# Patient Record
Sex: MALE | Race: WHITE | Employment: OTHER | URBAN - METROPOLITAN AREA
[De-identification: names, ages, dates, MRNs, and addresses within clinical notes are randomized per-mention and may not be internally consistent; named-entity substitution may affect disease eponyms.]

---

## 2017-01-27 ENCOUNTER — HOSPITAL ENCOUNTER (OUTPATIENT)
Dept: LAB | Age: 67
Discharge: HOME | End: 2017-01-27
Payer: MEDICARE

## 2017-01-27 ENCOUNTER — OFFICE VISIT (OUTPATIENT)
Dept: INTERNAL MEDICINE CLINIC | Age: 67
End: 2017-01-27

## 2017-01-27 VITALS
RESPIRATION RATE: 18 BRPM | WEIGHT: 197 LBS | HEIGHT: 68 IN | TEMPERATURE: 98.1 F | DIASTOLIC BLOOD PRESSURE: 88 MMHG | BODY MASS INDEX: 29.86 KG/M2 | OXYGEN SATURATION: 98 % | HEART RATE: 84 BPM | SYSTOLIC BLOOD PRESSURE: 138 MMHG

## 2017-01-27 DIAGNOSIS — E11.9 DIABETES MELLITUS TYPE 2, DIET-CONTROLLED (HCC): ICD-10-CM

## 2017-01-27 DIAGNOSIS — I25.10 CORONARY ARTERY DISEASE INVOLVING NATIVE CORONARY ARTERY OF NATIVE HEART WITHOUT ANGINA PECTORIS: Chronic | ICD-10-CM

## 2017-01-27 DIAGNOSIS — Z01.818 PREOP GENERAL PHYSICAL EXAM: ICD-10-CM

## 2017-01-27 DIAGNOSIS — R03.0 ELEVATED BLOOD PRESSURE (NOT HYPERTENSION): ICD-10-CM

## 2017-01-27 DIAGNOSIS — M19.072 DEGENERATIVE ARTHRITIS OF TOE JOINT, LEFT: Primary | ICD-10-CM

## 2017-01-27 PROCEDURE — 36415 COLL VENOUS BLD VENIPUNCTURE: CPT

## 2017-01-27 PROCEDURE — 80048 BASIC METABOLIC PNL TOTAL CA: CPT

## 2017-01-27 NOTE — PATIENT INSTRUCTIONS
DASH Diet: Care Instructions  Your Care Instructions  The DASH diet is an eating plan that can help lower your blood pressure. DASH stands for Dietary Approaches to Stop Hypertension. Hypertension is high blood pressure. The DASH diet focuses on eating foods that are high in calcium, potassium, and magnesium. These nutrients can lower blood pressure. The foods that are highest in these nutrients are fruits, vegetables, low-fat dairy products, nuts, seeds, and legumes. But taking calcium, potassium, and magnesium supplements instead of eating foods that are high in those nutrients does not have the same effect. The DASH diet also includes whole grains, fish, and poultry. The DASH diet is one of several lifestyle changes your doctor may recommend to lower your high blood pressure. Your doctor may also want you to decrease the amount of sodium in your diet. Lowering sodium while following the DASH diet can lower blood pressure even further than just the DASH diet alone. Follow-up care is a key part of your treatment and safety. Be sure to make and go to all appointments, and call your doctor if you are having problems. It's also a good idea to know your test results and keep a list of the medicines you take. How can you care for yourself at home? Following the DASH diet  · Eat 4 to 5 servings of fruit each day. A serving is 1 medium-sized piece of fruit, ½ cup chopped or canned fruit, 1/4 cup dried fruit, or 4 ounces (½ cup) of fruit juice. Choose fruit more often than fruit juice. · Eat 4 to 5 servings of vegetables each day. A serving is 1 cup of lettuce or raw leafy vegetables, ½ cup of chopped or cooked vegetables, or 4 ounces (½ cup) of vegetable juice. Choose vegetables more often than vegetable juice. · Get 2 to 3 servings of low-fat and fat-free dairy each day. A serving is 8 ounces of milk, 1 cup of yogurt, or 1 ½ ounces of cheese. · Eat 6 to 8 servings of grains each day.  A serving is 1 slice of bread, 1 ounce of dry cereal, or ½ cup of cooked rice, pasta, or cooked cereal. Try to choose whole-grain products as much as possible. · Limit lean meat, poultry, and fish to 2 servings each day. A serving is 3 ounces, about the size of a deck of cards. · Eat 4 to 5 servings of nuts, seeds, and legumes (cooked dried beans, lentils, and split peas) each week. A serving is 1/3 cup of nuts, 2 tablespoons of seeds, or ½ cup of cooked beans or peas. · Limit fats and oils to 2 to 3 servings each day. A serving is 1 teaspoon of vegetable oil or 2 tablespoons of salad dressing. · Limit sweets and added sugars to 5 servings or less a week. A serving is 1 tablespoon jelly or jam, ½ cup sorbet, or 1 cup of lemonade. · Eat less than 2,300 milligrams (mg) of sodium a day. If you limit your sodium to 1,500 mg a day, you can lower your blood pressure even more. Tips for success  · Start small. Do not try to make dramatic changes to your diet all at once. You might feel that you are missing out on your favorite foods and then be more likely to not follow the plan. Make small changes, and stick with them. Once those changes become habit, add a few more changes. · Try some of the following:  ¨ Make it a goal to eat a fruit or vegetable at every meal and at snacks. This will make it easy to get the recommended amount of fruits and vegetables each day. ¨ Try yogurt topped with fruit and nuts for a snack or healthy dessert. ¨ Add lettuce, tomato, cucumber, and onion to sandwiches. ¨ Combine a ready-made pizza crust with low-fat mozzarella cheese and lots of vegetable toppings. Try using tomatoes, squash, spinach, broccoli, carrots, cauliflower, and onions. ¨ Have a variety of cut-up vegetables with a low-fat dip as an appetizer instead of chips and dip. ¨ Sprinkle sunflower seeds or chopped almonds over salads. Or try adding chopped walnuts or almonds to cooked vegetables. ¨ Try some vegetarian meals using beans and peas. Add garbanzo or kidney beans to salads. Make burritos and tacos with mashed carver beans or black beans. Where can you learn more? Go to http://french-hailey.info/. Enter G348 in the search box to learn more about \"DASH Diet: Care Instructions. \"  Current as of: March 23, 2016  Content Version: 11.1  © 5802-9301 [email or web address]. Care instructions adapted under license by [employer] (which disclaims liability or warranty for this information). If you have questions about a medical condition or this instruction, always ask your healthcare professional. [place] [address] any warranty or liability for your use of this information. Learning About Type 2 Diabetes  What is type 2 diabetes? Insulin is a hormone that helps your body use sugar from your food as energy. Type 2 diabetes happens when your body can't use insulin the right way. Over time, the pancreas can't make enough insulin. If you don't have enough insulin, too much sugar stays in your blood. If you are overweight, get little or no exercise, or have type 2 diabetes in your family, you are more likely to have problems with the way insulin works in your body.  Americans, Hispanics, Native Americans,  Americans, and Pacific Islanders have a higher risk for type 2 diabetes. Type 2 diabetes can be prevented or delayed with a healthy lifestyle, which includes staying at a healthy weight, making smart food choices, and getting regular exercise. What can you expect with type 2 diabetes? Saurabh Solis keep hearing about how important it is to keep your blood sugar within a target range. That's because over time, high blood sugar can lead to serious problems. It can:  · Harm your eyes, nerves, and kidneys. · Damage your blood vessels, leading to heart disease and stroke. · Reduce blood flow and cause nerve damage to parts of your body, especially your feet.  This can cause slow healing and pain when you walk. · Make your immune system weak and less able to fight infections. When people hear the word \"diabetes,\" they often think of problems like these. But daily care and treatment can help prevent or delay these problems. The goal is to keep your blood sugar in a target range. That's the best way to reduce your chance of having more problems from diabetes. What are the symptoms? Some people who have type 2 diabetes may not have any symptoms early on. Many people with the disease don't even know they have it at first. But with time, diabetes starts to cause symptoms. You experience most symptoms of type 2 diabetes when your blood sugar is either too high or too low. The most common symptoms of high blood sugar include:  · Thirst.  · Frequent urination. · Weight loss. · Blurry vision. The symptoms of low blood sugar include:  · Sweating. · Shakiness. · Weakness. · Hunger. · Confusion. How can you prevent type 2 diabetes? The best way to prevent or delay type 2 diabetes is to adopt healthy habits, which include:  · Staying at a healthy weight. · Exercising regularly. · Eating healthy foods. How is type 2 diabetes treated? If you have type 2 diabetes, here are the most important things you can do. · Take your diabetes medicines. · Check your blood sugar as often as your doctor recommends. Also, get a hemoglobin A1c test at least every 6 months. · Try to eat a variety of foods and to spread carbohydrate throughout the day. Carbohydrate raises blood sugar higher and more quickly than any other nutrient does. Carbohydrate is found in sugar, breads and cereals, fruit, starchy vegetables such as potatoes and corn, and milk and yogurt. · Get at least 30 minutes of exercise on most days of the week. Walking is a good choice. You also may want to do other activities, such as running, swimming, cycling, or playing tennis or team sports.  If your doctor says it's okay, do muscle-strengthening exercises at least 2 times a week. · See your doctor for checkups and tests on a regular schedule. · If you have high blood pressure or high cholesterol, take the medicines as prescribed by your doctor. · Do not smoke. Smoking can make health problems worse. This includes problems you might have with type 2 diabetes. If you need help quitting, talk to your doctor about stop-smoking programs and medicines. These can increase your chances of quitting for good. Follow-up care is a key part of your treatment and safety. Be sure to make and go to all appointments, and call your doctor if you are having problems. It's also a good idea to know your test results and keep a list of the medicines you take. Where can you learn more? Go to http://french-hailey.info/. Enter G369 in the search box to learn more about \"Learning About Type 2 Diabetes. \"  Current as of: May 23, 2016  Content Version: 11.1  © 4459-4285 [employer]. Care instructions adapted under license by [employer] (which disclaims liability or warranty for this information). If you have questions about a medical condition or this instruction, always ask your healthcare professional. [place] [address] any warranty or liability for your use of this information.

## 2017-01-27 NOTE — MR AVS SNAPSHOT
Visit Information Date & Time Provider Department Dept. Phone Encounter #  
 1/27/2017  2:00 PM Savanna Mayen NP [facility] [address] [id number] Upcoming Health Maintenance Date Due Hepatitis C Screening 1950 FOOT EXAM Q1 11/12/1958 EYE EXAM RETINAL OR DILATED Q1 11/12/1958 ZOSTER VACCINE AGE 60> 11/12/2008 GLAUCOMA SCREENING Q2Y 11/12/2013 MEDICARE YEARLY EXAM 1/7/2017 MICROALBUMIN Q1 1/8/2017 LIPID PANEL Q1 1/8/2017 HEMOGLOBIN A1C Q6M 5/15/2017 Pneumococcal 65+ Low/Medium Risk (2 of 2 - PPSV23) 12/19/2017 COLONOSCOPY 7/17/2023 DTaP/Tdap/Td series (2 - Td) 1/1/2024 Allergies as of 1/27/2017  Review Complete On: 1/27/2017 By: Savanna Mayen NP Severity Noted Reaction Type Reactions Atorvastatin  02/23/2015    Myalgia  
 forgetfullness Simvastatin  02/23/2015    Other (comments)  
 forgetfullness Sulfa (Sulfonamide Antibiotics)  07/05/2011    Unknown (comments) Patient is unaware Sulfa (Sulfonamide Antibiotics)  12/19/2013   Side Effect Unknown (comments) Current Immunizations  Reviewed on 12/19/2013 Name Date Influenza High Dose Vaccine PF 11/15/2016 Pneumococcal Vaccine (Unspecified Type) 12/19/2012 Tdap 1/1/2014 Not reviewed this visit You Were Diagnosed With   
  
 Codes Comments Degenerative arthritis of toe joint, left    -  Primary ICD-10-CM: S37.512 ICD-9-CM: 715.97 Preop general physical exam     ICD-10-CM: U61.824 ICD-9-CM: V72.83 Diabetes mellitus type 2, diet-controlled ([facility] [address]     ICD-10-CM: E11.9 ICD-9-CM: 250.00 Coronary artery disease involving native coronary artery of native heart without angina pectoris     ICD-10-CM: I25.10 ICD-9-CM: 414.01 Elevated blood pressure (not hypertension)     ICD-10-CM: R03.0 ICD-9-CM: 796.2 Vitals BP Pulse Temp Resp Height(growth percentile) Weight(growth percentile) 138/88 (BP 1 Location: Left arm, BP Patient Position: Sitting) 84 98.1 °F (36.7 °C) (Oral) 18 5' 7.5\" (1.715 m) 197 lb (89.4 kg) SpO2 BMI Smoking Status 98% 30.4 kg/m2 Former Smoker Vitals History BMI and BSA Data Body Mass Index Body Surface Area  
 30.4 kg/m 2 2.06 m 2 Preferred Pharmacy Pharmacy Name Phone Anita Lopez [address] [facility], [address] [facility], [address] [phone] Your Updated Medication List  
  
   
This list is accurate as of: 1/27/17  3:10 PM.  Always use your most recent med list.  
  
  
  
  
 aspirin delayed-release 81 mg tablet Take 1 Tab by mouth daily. Blood-Glucose Meter monitoring kit Commonly known as:  FREESTYLE FREEDOM LITE For blood sugar testing twice daily. Dx E11.9 Flaxseed Oil Oil  
by Does Not Apply route. glucose blood VI test strips strip Commonly known as:  FREESTYLE LITE STRIPS Test fasting blood sugar twice daily. Dx E11.9 Lancets Mis Commonly known as:  FREESTYLE LANCETS For blood sugar testing twice daily. Dx E11.9 MULTI VITAMIN PO Take  by mouth. naproxen 500 mg tablet Commonly known as:  NAPROSYN Take 1 Tab by mouth two (2) times daily (with meals). We Performed the Following AMB POC EKG ROUTINE W/ 12 LEADS, INTER & REP [94626 CPT(R)] METABOLIC PANEL, BASIC [46939 CPT(R)] Patient Instructions DASH Diet: Care Instructions Your Care Instructions The DASH diet is an eating plan that can help lower your blood pressure. DASH stands for Dietary Approaches to Stop Hypertension. Hypertension is high blood pressure. The DASH diet focuses on eating foods that are high in calcium, potassium, and magnesium. These nutrients can lower blood pressure. The foods that are highest in these nutrients are fruits, vegetables, low-fat dairy products, nuts, seeds, and legumes.  But taking calcium, potassium, and magnesium supplements instead of eating foods that are high in those nutrients does not have the same effect. The DASH diet also includes whole grains, fish, and poultry. The DASH diet is one of several lifestyle changes your doctor may recommend to lower your high blood pressure. Your doctor may also want you to decrease the amount of sodium in your diet. Lowering sodium while following the DASH diet can lower blood pressure even further than just the DASH diet alone. Follow-up care is a key part of your treatment and safety. Be sure to make and go to all appointments, and call your doctor if you are having problems. It's also a good idea to know your test results and keep a list of the medicines you take. How can you care for yourself at home? Following the DASH diet · Eat 4 to 5 servings of fruit each day. A serving is 1 medium-sized piece of fruit, ½ cup chopped or canned fruit, 1/4 cup dried fruit, or 4 ounces (½ cup) of fruit juice. Choose fruit more often than fruit juice. · Eat 4 to 5 servings of vegetables each day. A serving is 1 cup of lettuce or raw leafy vegetables, ½ cup of chopped or cooked vegetables, or 4 ounces (½ cup) of vegetable juice. Choose vegetables more often than vegetable juice. · Get 2 to 3 servings of low-fat and fat-free dairy each day. A serving is 8 ounces of milk, 1 cup of yogurt, or 1 ½ ounces of cheese. · Eat 6 to 8 servings of grains each day. A serving is 1 slice of bread, 1 ounce of dry cereal, or ½ cup of cooked rice, pasta, or cooked cereal. Try to choose whole-grain products as much as possible. · Limit lean meat, poultry, and fish to 2 servings each day. A serving is 3 ounces, about the size of a deck of cards. · Eat 4 to 5 servings of nuts, seeds, and legumes (cooked dried beans, lentils, and split peas) each week. A serving is 1/3 cup of nuts, 2 tablespoons of seeds, or ½ cup of cooked beans or peas. · Limit fats and oils to 2 to 3 servings each day. A serving is 1 teaspoon of vegetable oil or 2 tablespoons of salad dressing. · Limit sweets and added sugars to 5 servings or less a week. A serving is 1 tablespoon jelly or jam, ½ cup sorbet, or 1 cup of lemonade. · Eat less than 2,300 milligrams (mg) of sodium a day. If you limit your sodium to 1,500 mg a day, you can lower your blood pressure even more. Tips for success · Start small. Do not try to make dramatic changes to your diet all at once. You might feel that you are missing out on your favorite foods and then be more likely to not follow the plan. Make small changes, and stick with them. Once those changes become habit, add a few more changes. · Try some of the following: ¨ Make it a goal to eat a fruit or vegetable at every meal and at snacks. This will make it easy to get the recommended amount of fruits and vegetables each day. ¨ Try yogurt topped with fruit and nuts for a snack or healthy dessert. ¨ Add lettuce, tomato, cucumber, and onion to sandwiches. ¨ Combine a ready-made pizza crust with low-fat mozzarella cheese and lots of vegetable toppings. Try using tomatoes, squash, spinach, broccoli, carrots, cauliflower, and onions. ¨ Have a variety of cut-up vegetables with a low-fat dip as an appetizer instead of chips and dip. ¨ Sprinkle sunflower seeds or chopped almonds over salads. Or try adding chopped walnuts or almonds to cooked vegetables. ¨ Try some vegetarian meals using beans and peas. Add garbanzo or kidney beans to salads. Make burritos and tacos with mashed carver beans or black beans. Where can you learn more? Go to http://french-hailey.info/. Enter F961 in the search box to learn more about \"DASH Diet: Care Instructions. \" Current as of: March 23, 2016 Content Version: 11.1 © 0435-1906 [employer].  Care instructions adapted under license by [address] (which disclaims liability or warranty for this information). If you have questions about a medical condition or this instruction, always ask your healthcare professional. [address] any warranty or liability for your use of this information. Learning About Type 2 Diabetes What is type 2 diabetes? Insulin is a hormone that helps your body use sugar from your food as energy. Type 2 diabetes happens when your body can't use insulin the right way. Over time, the pancreas can't make enough insulin. If you don't have enough insulin, too much sugar stays in your blood. If you are overweight, get little or no exercise, or have type 2 diabetes in your family, you are more likely to have problems with the way insulin works in your body.  Americans, Hispanics, Native Americans,  Americans, and Pacific Islanders have a higher risk for type 2 diabetes. Type 2 diabetes can be prevented or delayed with a healthy lifestyle, which includes staying at a healthy weight, making smart food choices, and getting regular exercise. What can you expect with type 2 diabetes? [facility] about how important it is to keep your blood sugar within a target range. That's because over time, high blood sugar can lead to serious problems. It can: 
· Harm your eyes, nerves, and kidneys. · Damage your blood vessels, leading to heart disease and stroke. · Reduce blood flow and cause nerve damage to parts of your body, especially your feet. This can cause slow healing and pain when you walk. · Make your immune system weak and less able to fight infections. When people hear the word \"diabetes,\" they often think of problems like these. But daily care and treatment can help prevent or delay these problems. The goal is to keep your blood sugar in a target range. That's the best way to reduce your chance of having more problems from diabetes. What are the symptoms? Some people who have type 2 diabetes may not have any symptoms early on. Many people with the disease don't even know they have it at first. But with time, diabetes starts to cause symptoms. You experience most symptoms of type 2 diabetes when your blood sugar is either too high or too low. The most common symptoms of high blood sugar include: · Thirst. 
· Frequent urination. · Weight loss. · Blurry vision. The symptoms of low blood sugar include: · Sweating. · Shakiness. · Weakness. · Hunger. · Confusion. How can you prevent type 2 diabetes? The best way to prevent or delay type 2 diabetes is to adopt healthy habits, which include: 
· Staying at a healthy weight. · Exercising regularly. · Eating healthy foods. How is type 2 diabetes treated? If you have type 2 diabetes, here are the most important things you can do. · Take your diabetes medicines. · Check your blood sugar as often as your doctor recommends. Also, get a hemoglobin A1c test at least every 6 months. · Try to eat a variety of foods and to spread carbohydrate throughout the day. Carbohydrate raises blood sugar higher and more quickly than any other nutrient does. Carbohydrate is found in sugar, breads and cereals, fruit, starchy vegetables such as potatoes and corn, and milk and yogurt. · Get at least 30 minutes of exercise on most days of the week. Walking is a good choice. You also may want to do other activities, such as running, swimming, cycling, or playing tennis or team sports. If your doctor says it's okay, do muscle-strengthening exercises at least 2 times a week. · See your doctor for checkups and tests on a regular schedule. · If you have high blood pressure or high cholesterol, take the medicines as prescribed by your doctor. · Do not smoke. Smoking can make health problems worse. This includes problems you might have with type 2 diabetes.  If you need help quitting, talk to your doctor about stop-smoking programs and medicines. These can increase your chances of quitting for good. Follow-up care is a key part of your treatment and safety. Be sure to make and go to all appointments, and call your doctor if you are having problems. It's also a good idea to know your test results and keep a list of the medicines you take. Where can you learn more? Go to http://french-hailey.info/. Enter N878 in the search box to learn more about \"Learning About Type 2 Diabetes. \" Current as of: May 23, 2016 Content Version: 11.1 © 5374-4041 [employer]. Care instructions adapted under license by [employer] (which disclaims liability or warranty for this information). If you have questions about a medical condition or this instruction, always ask your healthcare professional. [address] any warranty or liability for your use of this information. Introducing [facility]! Dear Maureen Purchase: Thank you for requesting a FFWD account. Our records indicate that you already have an active FFWD account. You can access your account anytime at [email or web address] Did you know that you can access your hospital and ER discharge instructions at any time in FFWD? You can also review all of your test results from your hospital stay or ER visit. Additional Information If you have questions, please visit the Frequently Asked Questions section of the FFWD website at [email or web address] Remember, FFWD is NOT to be used for urgent needs. For medical emergencies, dial 911. Now available from your iPhone and Android! Please provide this summary of care documentation to your next provider. Your primary care clinician is listed as Dameon Madrigal. If you have any questions after today's visit, please call [phone].

## 2017-01-27 NOTE — PROGRESS NOTES
Have you been to the ER or urgent care clinic since your last visit? No     Have you been hospitalized since your last visit? No     Have you been seen or consulted any other health care provider outside of [facility] since your last visit (including pap smears, colonoscopy screening)?    No

## 2017-01-28 LAB
BUN SERPL-MCNC: 12 MG/DL (ref 8–27)
BUN/CREAT SERPL: 13 (ref 10–22)
CALCIUM SERPL-MCNC: 9.4 MG/DL (ref 8.6–10.2)
CHLORIDE SERPL-SCNC: 101 MMOL/L (ref 96–106)
CO2 SERPL-SCNC: 24 MMOL/L (ref 18–29)
CREAT SERPL-MCNC: 0.89 MG/DL (ref 0.76–1.27)
GLUCOSE SERPL-MCNC: 116 MG/DL (ref 65–99)
POTASSIUM SERPL-SCNC: 4.5 MMOL/L (ref 3.5–5.2)
SODIUM SERPL-SCNC: 141 MMOL/L (ref 134–144)

## 2017-02-07 ENCOUNTER — ANESTHESIA EVENT (OUTPATIENT)
Dept: SURGERY | Age: 67
End: 2017-02-07
Payer: MEDICARE

## 2017-02-07 RX ORDER — HYDROMORPHONE HYDROCHLORIDE 1 MG/ML
0.5 INJECTION, SOLUTION INTRAMUSCULAR; INTRAVENOUS; SUBCUTANEOUS
Status: CANCELLED | OUTPATIENT
Start: 2017-02-07 | End: 2017-02-08

## 2017-02-07 RX ORDER — SODIUM CHLORIDE, SODIUM LACTATE, POTASSIUM CHLORIDE, CALCIUM CHLORIDE 600; 310; 30; 20 MG/100ML; MG/100ML; MG/100ML; MG/100ML
125 INJECTION, SOLUTION INTRAVENOUS CONTINUOUS
Status: CANCELLED | OUTPATIENT
Start: 2017-02-07

## 2017-02-07 RX ORDER — ONDANSETRON 2 MG/ML
4 INJECTION INTRAMUSCULAR; INTRAVENOUS AS NEEDED
Status: CANCELLED | OUTPATIENT
Start: 2017-02-07

## 2017-02-07 RX ORDER — ALBUTEROL SULFATE 0.83 MG/ML
2.5 SOLUTION RESPIRATORY (INHALATION) AS NEEDED
Status: CANCELLED | OUTPATIENT
Start: 2017-02-07

## 2017-02-07 RX ORDER — DIPHENHYDRAMINE HYDROCHLORIDE 50 MG/ML
12.5 INJECTION, SOLUTION INTRAMUSCULAR; INTRAVENOUS AS NEEDED
Status: CANCELLED | OUTPATIENT
Start: 2017-02-07 | End: 2017-02-07

## 2017-02-08 ENCOUNTER — ANESTHESIA (OUTPATIENT)
Dept: SURGERY | Age: 67
End: 2017-02-08
Payer: MEDICARE

## 2017-02-08 ENCOUNTER — HOSPITAL ENCOUNTER (OUTPATIENT)
Age: 67
Setting detail: OUTPATIENT SURGERY
Discharge: HOME | End: 2017-02-08
Attending: ORTHOPAEDIC SURGERY | Admitting: ORTHOPAEDIC SURGERY
Payer: MEDICARE

## 2017-02-08 ENCOUNTER — SURGERY (OUTPATIENT)
Age: 67
End: 2017-02-08

## 2017-02-08 VITALS
OXYGEN SATURATION: 99 % | SYSTOLIC BLOOD PRESSURE: 144 MMHG | WEIGHT: 199.52 LBS | HEART RATE: 52 BPM | HEIGHT: 68 IN | TEMPERATURE: 97 F | DIASTOLIC BLOOD PRESSURE: 86 MMHG | BODY MASS INDEX: 30.24 KG/M2 | RESPIRATION RATE: 15 BRPM

## 2017-02-08 LAB
GLUCOSE BLD STRIP.AUTO-MCNC: 129 MG/DL (ref 65–100)
SERVICE CMNT-IMP: ABNORMAL

## 2017-02-08 PROCEDURE — 97161 PT EVAL LOW COMPLEX 20 MIN: CPT

## 2017-02-08 PROCEDURE — 76010000138 HC OR TIME 0.5 TO 1 HR: Performed by: ORTHOPAEDIC SURGERY

## 2017-02-08 PROCEDURE — 74011250636 HC RX REV CODE- 250/636

## 2017-02-08 PROCEDURE — 77030011640 HC PAD GRND REM COVD -A: Performed by: ORTHOPAEDIC SURGERY

## 2017-02-08 PROCEDURE — 77030020754 HC CUF TRNQT 2BLA STRY -B: Performed by: ORTHOPAEDIC SURGERY

## 2017-02-08 PROCEDURE — 77030002916 HC SUT ETHLN J&J -A: Performed by: ORTHOPAEDIC SURGERY

## 2017-02-08 PROCEDURE — 77030020782 HC GWN BAIR PAWS FLX 3M -B

## 2017-02-08 PROCEDURE — G8979 MOBILITY GOAL STATUS: HCPCS

## 2017-02-08 PROCEDURE — G8980 MOBILITY D/C STATUS: HCPCS

## 2017-02-08 PROCEDURE — 97116 GAIT TRAINING THERAPY: CPT

## 2017-02-08 PROCEDURE — 82962 GLUCOSE BLOOD TEST: CPT

## 2017-02-08 PROCEDURE — 77030002946 HC SUT NRLN J&J -B: Performed by: ORTHOPAEDIC SURGERY

## 2017-02-08 PROCEDURE — G8978 MOBILITY CURRENT STATUS: HCPCS

## 2017-02-08 PROCEDURE — 76210000026 HC REC RM PH II 1 TO 1.5 HR: Performed by: ORTHOPAEDIC SURGERY

## 2017-02-08 PROCEDURE — 74011250636 HC RX REV CODE- 250/636: Performed by: ANESTHESIOLOGY

## 2017-02-08 PROCEDURE — 76060000032 HC ANESTHESIA 0.5 TO 1 HR: Performed by: ORTHOPAEDIC SURGERY

## 2017-02-08 PROCEDURE — 77030003601 HC NDL NRV BLK BBMI -A

## 2017-02-08 PROCEDURE — 77030018836 HC SOL IRR NACL ICUM -A: Performed by: ORTHOPAEDIC SURGERY

## 2017-02-08 PROCEDURE — 74011250636 HC RX REV CODE- 250/636: Performed by: ORTHOPAEDIC SURGERY

## 2017-02-08 RX ORDER — FLAXSEED OIL 1000 MG
1000 CAPSULE ORAL 2 TIMES DAILY
COMMUNITY

## 2017-02-08 RX ORDER — SODIUM CHLORIDE, SODIUM LACTATE, POTASSIUM CHLORIDE, CALCIUM CHLORIDE 600; 310; 30; 20 MG/100ML; MG/100ML; MG/100ML; MG/100ML
150 INJECTION, SOLUTION INTRAVENOUS CONTINUOUS
Status: DISCONTINUED | OUTPATIENT
Start: 2017-02-08 | End: 2017-02-08 | Stop reason: HOSPADM

## 2017-02-08 RX ORDER — PROPOFOL 10 MG/ML
INJECTION, EMULSION INTRAVENOUS
Status: DISCONTINUED | OUTPATIENT
Start: 2017-02-08 | End: 2017-02-08 | Stop reason: HOSPADM

## 2017-02-08 RX ORDER — FENTANYL CITRATE 50 UG/ML
INJECTION, SOLUTION INTRAMUSCULAR; INTRAVENOUS AS NEEDED
Status: DISCONTINUED | OUTPATIENT
Start: 2017-02-08 | End: 2017-02-08 | Stop reason: HOSPADM

## 2017-02-08 RX ORDER — CEFAZOLIN SODIUM IN 0.9 % NACL 2 G/50 ML
2 INTRAVENOUS SOLUTION, PIGGYBACK (ML) INTRAVENOUS ONCE
Status: COMPLETED | OUTPATIENT
Start: 2017-02-08 | End: 2017-02-08

## 2017-02-08 RX ORDER — LIDOCAINE HYDROCHLORIDE 10 MG/ML
0.1 INJECTION, SOLUTION EPIDURAL; INFILTRATION; INTRACAUDAL; PERINEURAL AS NEEDED
Status: DISCONTINUED | OUTPATIENT
Start: 2017-02-08 | End: 2017-02-08 | Stop reason: HOSPADM

## 2017-02-08 RX ORDER — MIDAZOLAM HYDROCHLORIDE 1 MG/ML
INJECTION, SOLUTION INTRAMUSCULAR; INTRAVENOUS AS NEEDED
Status: DISCONTINUED | OUTPATIENT
Start: 2017-02-08 | End: 2017-02-08 | Stop reason: HOSPADM

## 2017-02-08 RX ADMIN — FENTANYL CITRATE 50 MCG: 50 INJECTION, SOLUTION INTRAMUSCULAR; INTRAVENOUS at 08:17

## 2017-02-08 RX ADMIN — SODIUM CHLORIDE, SODIUM LACTATE, POTASSIUM CHLORIDE, AND CALCIUM CHLORIDE 150 ML/HR: 600; 310; 30; 20 INJECTION, SOLUTION INTRAVENOUS at 07:05

## 2017-02-08 RX ADMIN — MIDAZOLAM HYDROCHLORIDE 1 MG: 1 INJECTION, SOLUTION INTRAMUSCULAR; INTRAVENOUS at 08:22

## 2017-02-08 RX ADMIN — MIDAZOLAM HYDROCHLORIDE 2 MG: 1 INJECTION, SOLUTION INTRAMUSCULAR; INTRAVENOUS at 08:17

## 2017-02-08 RX ADMIN — PROPOFOL 50 MCG/KG/MIN: 10 INJECTION, EMULSION INTRAVENOUS at 08:53

## 2017-02-08 RX ADMIN — FENTANYL CITRATE 50 MCG: 50 INJECTION, SOLUTION INTRAMUSCULAR; INTRAVENOUS at 08:23

## 2017-02-08 RX ADMIN — CEFAZOLIN 2 G: 1 INJECTION, POWDER, FOR SOLUTION INTRAMUSCULAR; INTRAVENOUS; PARENTERAL at 08:47

## 2017-02-08 RX ADMIN — MIDAZOLAM HYDROCHLORIDE 2 MG: 1 INJECTION, SOLUTION INTRAMUSCULAR; INTRAVENOUS at 08:49

## 2017-02-08 NOTE — DISCHARGE INSTRUCTIONS
DISCHARGE SUMMARY from your Nurse    The following personal items collected during your admission are returned to you:   Dental Appliance: Dental Appliances: None  Vision: Visual Aid: Glasses (with wife)  Hearing Aid:    Jewelry: Jewelry: None  Clothing: Clothing: Pants, Shirt, Socks, Footwear, Undergarments  Other Valuables: Other Valuables: None  Valuables sent to safe:      PATIENT INSTRUCTIONS:    After general anesthesia or intravenous sedation, for 24 hours or while taking prescription Narcotics:  · Limit your activities  · Do not drive and operate hazardous machinery  · Do not make important personal or business decisions  · Do  not drink alcoholic beverages  · If you have not urinated within 8 hours after discharge, please contact your surgeon on call. Report the following to your surgeon:  · Excessive pain, swelling, redness or odor of or around the surgical area  · Temperature over 100.5  · Nausea and vomiting lasting longer than 4 hours or if unable to take medications  · Any signs of decreased circulation or nerve impairment to extremity: change in color, persistent  numbness, tingling, coldness or increase pain  · Any questions    COUGH AND DEEP BREATHE    Breathing deep and coughing are very important exercises to do after surgery. Deep breathing and coughing open the little air tubes and air sacks in your lungs. You take deep breaths every day. You may not even notice - it is just something you do when you sigh or yawn. It is a natural exercise you do to keep these air passages open. After surgery, take deep breaths and cough, on purpose. Coughing and deep breathing help prevent bronchitis and pneumonia after surgery. If you had chest or belly surgery, use a pillow as a \"hug buddy\" and hold it tightly to your chest or belly when you cough. DIRECTIONS:  6. Take 10 to 15 slow deep breaths every hour while awake. 7. Breathe in deeply, and hold it for 2 seconds.   8. Exhale slowly through puckered lips, like blowing up a balloon. 9. After every 4th or 5th deep breath, hug your pillow to your chest or belly and give a hard, deep cough. Yes, it will probably hurt. But doing this exercise is very important part of healing after surgery. Take your pain medicine to help you do this exercise without too much pain. IF YOU HAVE BEEN DIAGNOSED WITH SLEEP APNEA, PLEASE USE YOUR SLEEIFP APNEA DEVICE OR CPAP MACHINE WHEN YOU INTEND TO NAP AFTER TAKING PAIN MEDICATION. Ankle Pumps    Ankle pumps increase the circulation of oxygenated blood to your lower extremities and decrease your risk for circulation problems such as blood clots. They also stretch the muscles, tendons and ligaments in your foot and ankle, and prevent joint contracture in the ankle and foot, especially after surgeries on the legs. It is important to do ankle pump exercises regularly after surgery because immobility increases your risk for developing a blood clot. Your doctor may also have you take an Aspirin for the next few days as well. If your doctor did not ask you to take an Aspirin, consult with him before starting Aspirin therapy on your own. Slowly point your foot forward, feeling the muscles on the top of your lower leg stretch, and hold this position for 5 seconds. Next, pull your foot back toward you as far as possible, stretching the calf muscles, and hold that position for 5 seconds. Repeat with the other foot. Perform 10 repetitions every hour while awake for both ankles if possible (down and then up with the foot once is one repetition). You should feel gentle stretching of the muscles in your lower leg when doing this exercise. If you feel pain, or your range of motion is limited, don't  Push too hard. Only go the limit your joint and muscles will let you go.   If you have increasing pain, progressively worsening leg warmth or swelling, STOP the exercise and call your doctor. Below is information about the medications your doctor is prescribing after your visit:    Other information in your discharge envelope:  [x]     PRESCRIPTIONS  []     PHYSICAL THERAPY PRESCRIPTION  []     APPOINTMENT CARDS  []     Regional Anesthesia Pamphlet for block or block with On-Q Catheter from Anesthesia Service  []     Medical device information sheets/pamphlets from their    []     School/work excuse note. []     /parent work excuse note. These are general instructions for a healthy lifestyle:    *  Please give a list of your current medications to your Primary Care Provider. *  Please update this list whenever your medications are discontinued, doses are      changed, or new medications (including over-the-counter products) are added. *  Please carry medication information at all times in case of emergency situations. About Smoking  No smoking / No tobacco products / Avoid exposure to second hand smoke    Surgeon General's Warning:  Quitting smoking now greatly reduces serious risk to your health. Obesity, smoking, and sedentary lifestyle greatly increases your risk for illness and disease. A healthy diet, regular physical exercise & weight monitoring are important for maintaining a healthy lifestyle. Congestive Heart Failure  You may be retaining fluid if you have a history of heart failure or if you experience any of the following symptoms:  Weight gain of 3 pounds or more overnight or 5 pounds in a week, increased swelling in our hands or feet or shortness of breath while lying flat in bed. Please call your doctor as soon as you notice any of these symptoms; do not wait until your next office visit.     Recognize signs and symptoms of STROKE:  F - face looks uneven  A - arms unable to move or move even  S - speech slurred or non-existent  T - time-call 911 as soon as signs and symptoms begin-DO NOT go         Back to bed or wait to see if you get better-TIME IS BRAIN. Warning signs of HEART ATTACK  Call 911 if you have these symptoms    · Chest discomfort. Most heart attacks involve discomfort in the center of the chest that lasts more than a few minutes, or that goes away and comes back. It can feel like uncomfortable pressure, squeezing, fullness, or pain. · Discomfort in other areas of the upper body. Symptoms can include pain or discomfort in one or both        Arms, the back, neck, jaw, or stomach. ·  Shortness of breath with or without chest discomfort. · Other signs may include breaking out in a cold sweat, nausea, or lightheadedness    Don't wait more than five minutes to call 911 - MINUTES MATTER! Fast action can save your life. Calling 911 is almost always the fastest way to get lifesaving treatment. Emergency Medical Services staff can begin treatment when they arrive - up to an hour sooner than if someone gets to the hospital by car. [facility] MEDICATION AND SIDE EFFECT GUIDE    The [facility] MEDICATION AND SIDE EFFECT GUIDE was provided to the PATIENT AND CARE PROVIDER.   Information provided includes instruction about drug purpose and common side effects for the following medications:    · oxycodone

## 2017-02-08 NOTE — ROUTINE PROCESS
physical Therapy EVALUATION  (Ambulatory surgery, emergency room & recovery room patients)    Patient: Phoenix Pacheco (72 y.o. male)  Date: 2/8/2017  Primary Diagnosis and Medical History: ARTHRITIS LEFT FOOT  Procedure(s) (LRB):  2ND DISTAL INTERPHALANGEAL JOINT EXCISIONAL ARTHROPLASTY LEFT FOOT (ANKLE BLOCK) (Left) Day of Surgery   Past Medical History   Diagnosis Date    CAD (coronary artery disease)      hypercholesterolemia    Hypercholesterolemia     Hypertension      Past Surgical History   Procedure Laterality Date    Pr cardiac surg procedure unlist  11/1996     stent placed x2     Hx hernia repair       inguinal hernia    Hx hernia repair       bilateral inguinal    Hx heent       wisdom teeth extraction    Hx colonoscopy  07/2011     normal     Patient Active Problem List   Diagnosis Code    CAD (coronary artery disease) I25.10    Hyperlipidemia E78.5    Advanced care planning/counseling discussion Z71.89    Diabetes mellitus type 2, diet-controlled ([facility] [address] E11.9     Prior Level of Function/Home Situation:   Personal factors and/or comorbidities impacting plan of care:     Home Situation  Home Environment: Private residence  # Steps to Enter: 3  Rails to Enter: Yes  Hand Rails : Left  Wheelchair Ramp: No  One/Two Story Residence: One story  Living Alone: No  Support Systems: Spouse/Significant Other/Partner, Family member(s)  Patient Expects to be Discharged to[de-identified] Private residence  Current DME Used/Available at Home: None  Ordered Weight Bearing Status:  left non-weight  Equipment: walker    EXAMINATION/PRESENTATION/DECISION MAKING:   Critical Behavior:  Neurologic State: Alert  Orientation Level: Appropriate for age, Oriented X4 (Answers all questions correct but stating he sees ants)  Cognition: Follows commands     Transfers:  Overall level of assistance required following instruction: supervision/set-up given verbal using RW.   Ambulation:  Weight bearing status during ambulation: Non-weight bearing  Distance (ft): 50 Feet (ft)  Assistive Device: Walker, rolling, Gait belt  Ambulation - Level of Assistance: Contact guard assistance     Stair Management:  Number of Stairs Trained: 3  Stairs - Level of Assistance: Contact guard assistance  Rail Use: Left  (bilatearl UE support)  Strength/ROM Limitations:    Special Tests:  Barthel Index:    Bathin  Bladder: 10  Bowels: 10  Groomin  Dressing: 10  Feeding: 10  Mobility: 15  Stairs: 5  Toilet Use: 10  Transfer (Bed to Chair and Back): 15  Total: 95       Barthel and G-code impairment scale:  Percentage of impairment CH  0% CI  1-19% CJ  20-39% CK  40-59% CL  60-79% CM  80-99% CN  100%   Barthel Score 0-100 100 99-80 79-60 59-40 20-39 1-19   0   Barthel Score 0-20 20 17-19 13-16 9-12 5-8 1-4 0      The Barthel ADL Index: Guidelines  1. The index should be used as a record of what a patient does, not as a record of what a patient could do. 2. The main aim is to establish degree of independence from any help, physical or verbal, however minor and for whatever reason. 3. The need for supervision renders the patient not independent. 4. A patient's performance should be established using the best available evidence. Asking the patient, friends/relatives and nurses are the usual sources, but direct observation and common sense are also important. However direct testing is not needed. 5. Usually the patient's performance over the preceding 24-48 hours is important, but occasionally longer periods will be relevant. 6. Middle categories imply that the patient supplies over 50 per cent of the effort. 7. Use of aids to be independent is allowed. Shirlene Vyas., Barthel, LEXXW. (1640). Functional evaluation: the Barthel Index. [address] [facility] (14)2. Esperanza Ban ella [place], J.J.M.F, Raz Shepard, Paul Bosch, Ligia Hernandez, [address] [place] [address] ().  Measuring the change indisability after inpatient rehabilitation; comparison of the responsiveness of the Barthel Index and Functional [place] Measure. Journal of Neurology, Neurosurgery, and Psychiatry, 664), 424-616. JOSE M Washington, FABI Pelaez, & Chelsey Hagen M.A. (2004.) Assessment of post-stroke quality of life in cost-effectiveness studies: The usefulness of the Barthel Index and the EuroQoL-5D. Quality of Life Research, 13, 427-43        In compliance with CMSs Claims Based Outcome Reporting, the following G-code set was chosen for this patient based on their primary functional limitation being treated: The outcome measure chosen to determine the severity of the functional limitation was the Barthel Index with a score of 95/100 which was correlated with the impairment scale. ? Mobility - Walking and Moving Around:     - CURRENT STATUS: CM - 80%-99% impaired, limited or restricted    - GOAL STATUS: CM - 80%-99% impaired, limited or restricted    - D/C STATUS:  CM - 80%-99% impaired, limited or restricted      Physical Therapy Evaluation Charge Determination   History Examination Presentation Decision-Making   MEDIUM  Complexity : 1-2 comorbidities / personal factors will impact the outcome/ POC  MEDIUM Complexity : 3 Standardized tests and measures addressing body structure, function, activity limitation and / or participation in recreation  LOW Complexity : Stable, uncomplicated  Other outcome measures Barthel Index  LOW       Based on the above components, the patient evaluation is determined to be of the following complexity level: LOW     Pain:  Pain Scale 1: Numeric (0 - 10)  Pain Intensity 1: 0       Education:  Role of P.T. explained to the patient:  []  Yes              []   No       Topics addressed: Comments:   [x]                                    Device use and technique RW and step-to technique with hopping technique due to NWB on the Left LE.   Patient requests RW due to not being able to manage axillary crutches   [x]                                    Transfer technique Stand pivot transfer with RW CGA   [x]                                    Gait training 50 feet with RW and CGA, step-to technique   [x]                                    Stair training 3 steps with Left hand rail, using side-hop technique to allow bilateral UE support. CGA, increased tactile and verbal cuing for Let LE placement due to fatigue and lowering of the        Patient is discharged from physical therapy at this time.     Columba Pisano, PT, DPT   Time Calculation: 15 mins

## 2017-02-08 NOTE — ANESTHESIA PREPROCEDURE EVALUATION
Anesthetic History   No history of anesthetic complications            Review of Systems / Medical History  Patient summary reviewed, nursing notes reviewed and pertinent labs reviewed    Pulmonary          Smoker      Comments: 36 year hx  Quit 2012   Neuro/Psych   Within defined limits           Cardiovascular    Hypertension: well controlled          CAD      Comments: No CP  HTN not yet requiring meds  ASA stopped by PCP 10d ago  Stent 1996   GI/Hepatic/Renal  Within defined limits              Endo/Other    Diabetes        Comments: Diet controlled Other Findings              Physical Exam    Airway  Mallampati: II  TM Distance: 4 - 6 cm  Neck ROM: normal range of motion        Cardiovascular  Regular rate and rhythm,  S1 and S2 normal,  no murmur, click, rub, or gallop             Dental         Pulmonary  Breath sounds clear to auscultation               Abdominal         Other Findings            Anesthetic Plan    ASA: 3  Anesthesia type: regional and MAC          Induction: Intravenous  Anesthetic plan and risks discussed with: Patient

## 2017-02-08 NOTE — ANESTHESIA PROCEDURE NOTES
Peripheral Block    Start time: 2/8/2017 8:17 AM  End time: 2/8/2017 8:25 AM  Performed by: Juan Diego Anne  Authorized by: Juan Diego Anne       Pre-procedure: Indications: at surgeon's request and primary anesthetic    Preanesthetic Checklist: patient identified, risks and benefits discussed, site marked, timeout performed, anesthesia consent given and patient being monitored    Timeout Time: 08:16          Block Type:   Block Type:   Ankle  Laterality:  Left  Monitoring:  Continuous pulse ox, frequent vital sign checks, heart rate, responsive to questions and oxygen  Injection Technique:  Single shot  Patient Position: seated  Prep: chlorhexidine    Location:  Foot  Needle Localization:  Anatomical landmarks, infiltration and ultrasound guidance  Medication Injected:  0.5%  ropivacaine  Volume (mL):  20  Add'l Medication Injected:  2.0%  mepivacaine  Volume (mL):  20    Assessment:  Number of attempts:  1  Injection Assessment:  Incremental injection every 5 mL, local visualized surrounding nerve on ultrasound, negative aspiration for blood, no paresthesia and no intravascular symptoms  Patient tolerance:  Patient tolerated the procedure well with no immediate complications

## 2017-02-08 NOTE — IP AVS SNAPSHOT
[address] [facility] 
[phone] Patient: Clem Fraser MRN: [id number] :1950 You are allergic to the following Allergen Reactions Atorvastatin Myalgia  
 forgetfullness Simvastatin Other (comments)  
 forgetfullness Sulfa (Sulfonamide Antibiotics) Unknown (comments) Patient is unaware Sulfa (Sulfonamide Antibiotics) Unknown (comments) Recent Documentation Height Weight BMI Smoking Status 1.727 m 90.5 kg 30.34 kg/m2 Former Smoker Emergency Contacts Name Discharge Info Relation Home Work Mobile [address] CAREGIVER [3] Spouse [3] [phone] About your hospitalization You were admitted on:  2017 You last received care in the:  [facility] PACU You were discharged on:  2017 Unit phone number:  [phone] Why you were hospitalized Your primary diagnosis was:  Not on File Providers Seen During Your Hospitalizations Provider Role Specialty Primary office phone Aden Avalos MD Attending Provider Orthopedic Surgery [phone] Your Primary Care Physician (PCP) Primary Care Physician Office Phone Office Fax Dedrick Solo [phone] Follow-up Information Follow up With Details Comments Contact Info Agueda Estes MD   [place] [address] [address] [facility] [address] [facility] 
[phone] Current Discharge Medication List  
  
CONTINUE these medications which have CHANGED Dose & Instructions Dispensing Information Comments Morning Noon Evening Bedtime  
 naproxen 500 mg tablet Commonly known as:  NAPROSYN What changed:   
- when to take this 
- reasons to take this Your next dose is: Today, Tomorrow Other:  _________  Dose:  500 mg  
 Take 1 Tab by mouth two (2) times daily (with meals). Quantity:  20 Tab Refills:  0 CONTINUE these medications which have NOT CHANGED Dose & Instructions Dispensing Information Comments Morning Noon Evening Bedtime  
 aspirin delayed-release 81 mg tablet Your next dose is: Today, Tomorrow Other:  _________ Dose:  81 mg Take 1 Tab by mouth daily. Refills:  0 Blood-Glucose Meter monitoring kit Commonly known as:  FREESTYLE FREEDOM LITE Your next dose is: Today, Tomorrow Other:  _________ For blood sugar testing twice daily. Dx E11.9 Quantity:  1 Kit Refills:  0  
     
   
   
   
  
 flaxseed oil 1,000 mg Cap Your next dose is: Today, Tomorrow Other:  _________ Dose:  1000 mg Take 1,000 mg by mouth two (2) times a day. Refills:  0  
     
   
   
   
  
 glucose blood VI test strips strip Commonly known as:  FREESTYLE LITE STRIPS Your next dose is: Today, Tomorrow Other:  _________ Test fasting blood sugar twice daily. Dx E11.9 Quantity:  180 Strip Refills:  11 Test strips for freestyle freedom lite. Lancets Misc Commonly known as:  FREESTYLE LANCETS Your next dose is: Today, Tomorrow Other:  _________ For blood sugar testing twice daily. Dx E11.9 Quantity:  200 Each Refills:  11 Freestyle freedom lite. MULTI VITAMIN PO Your next dose is: Today, Tomorrow Other:  _________ Dose:  1 Tab Take 1 Tab by mouth daily. Refills:  0  
     
   
   
   
  
 VITAMIN D3 PO Your next dose is: Today, Tomorrow Other:  _________ Dose:  0.5 Tab Take 0.5 Tabs by mouth two (2) times a day. Refills:  0 Discharge Instructions DISCHARGE SUMMARY from your Nurse The following personal items collected during your admission are returned to you:  
Dental Appliance: Dental Appliances: None Vision: Visual Aid: Glasses (with wife) Hearing Aid:   
Jewelry: Jewelry: None Clothing: Clothing: Pants, Shirt, Socks, Footwear, Undergarments Other Valuables: Other Valuables: None Valuables sent to safe:   
 
PATIENT INSTRUCTIONS: 
 
After general anesthesia or intravenous sedation, for 24 hours or while taking prescription Narcotics: · Limit your activities · Do not drive and operate hazardous machinery · Do not make important personal or business decisions · Do  not drink alcoholic beverages · If you have not urinated within 8 hours after discharge, please contact your surgeon on call. Report the following to your surgeon: 
· Excessive pain, swelling, redness or odor of or around the surgical area · Temperature over 100.5 · Nausea and vomiting lasting longer than 4 hours or if unable to take medications · Any signs of decreased circulation or nerve impairment to extremity: change in color, persistent  numbness, tingling, coldness or increase pain · Any questions [address] Breathing deep and coughing are very important exercises to do after surgery. Deep breathing and coughing open the little air tubes and air sacks in your lungs. You take deep breaths every day. You may not even notice - it is just something you do when you sigh or yawn. It is a natural exercise you do to keep these air passages open. After surgery, take deep breaths and cough, on purpose. Coughing and deep breathing help prevent bronchitis and pneumonia after surgery. If you had chest or belly surgery, use a pillow as a \"hug buddy\" and hold it tightly to your chest or belly when you cough. DIRECTIONS: 
6. Take 10 to 15 slow deep breaths every hour while awake. 7. Breathe in deeply, and hold it for 2 seconds. 8. Exhale slowly through puckered lips, like blowing up a balloon. 9. After every 4th or 5th deep breath, hug your pillow to your chest or belly and give a hard, deep cough. Yes, it will probably hurt. But doing this exercise is very important part of healing after surgery. Take your pain medicine to help you do this exercise without too much pain. IF YOU HAVE BEEN DIAGNOSED WITH SLEEP APNEA, PLEASE USE YOUR SLEEIFP APNEA DEVICE OR CPAP MACHINE WHEN YOU INTEND TO NAP AFTER TAKING PAIN MEDICATION. Ankle Pumps Ankle pumps increase the circulation of oxygenated blood to your lower extremities and decrease your risk for circulation problems such as blood clots. They also stretch the muscles, tendons and ligaments in your foot and ankle, and prevent joint contracture in the ankle and foot, especially after surgeries on the legs. It is important to do ankle pump exercises regularly after surgery because immobility increases your risk for developing a blood clot. Your doctor may also have you take an Aspirin for the next few days as well. If your doctor did not ask you to take an Aspirin, consult with him before starting Aspirin therapy on your own. Slowly point your foot forward, feeling the muscles on the top of your lower leg stretch, and hold this position for 5 seconds. Next, pull your foot back toward you as far as possible, stretching the calf muscles, and hold that position for 5 seconds. Repeat with the other foot. Perform 10 repetitions every hour while awake for both ankles if possible (down and then up with the foot once is one repetition). You should feel gentle stretching of the muscles in your lower leg when doing this exercise. If you feel pain, or your range of motion is limited, don't  Push too hard. Only go the limit your joint and muscles will let you go.   If you have increasing pain, progressively worsening leg warmth or swelling, STOP the exercise and call your doctor. Below is information about the medications your doctor is prescribing after your visit: 
 
 
· oxycodone Discharge Orders None Introducing [facility] SERVICES! Dear Sejal Nolasco: Thank you for requesting a [employer] account. Our records indicate that you already have an active [employer] account. You can access your account anytime at [email or web address] Did you know that you can access your hospital and ER discharge instructions at any time in [employer]? You can also review all of your test results from your hospital stay or ER visit. Additional Information If you have questions, please visit the Frequently Asked Questions section of the [employer] website at [email or web address] Remember, MyChart is NOT to be used for urgent needs. For medical emergencies, dial 911. Now available from your iPhone and Android! General Information Please provide this summary of care documentation to your next provider. Patient Signature:  ____________________________________________________________ Date:  ____________________________________________________________  
  
Shannan Sleight Provider Signature:  ____________________________________________________________ Date:  ____________________________________________________________

## 2017-02-08 NOTE — OP NOTES
Gerhard Barney [facility] [address]   [address] [facility], [address]   OP NOTE       Name:  Vidal Gama   MR#:  [id number]   :  1950   Account #:  [de-identified]    Surgery Date:  2017   Date of Adm:  2017       PREOPERATIVE DIAGNOSIS: Arthritis, left second toe distal   interphalangeal joint. POSTOPERATIVE DIAGNOSIS: Arthritis, left second toe distal   interphalangeal joint. PROCEDURES PERFORMED: Left second distal interphalangeal joint   excisional arthroplasty. SURGEON: Maria Luisa Martin MD     ANESTHESIA: Ankle block. COMPLICATIONS: None encountered. TOURNIQUET TIME: 15 minutes. ESTIMATED BLOOD LOSS: Nil. SPECIMENS REMOVED: None. PROCEDURE IN DETAIL: After consents were obtained and   preoperative sedation, the patient was taken to the operating suites   and underwent an ankle block without difficulty. Pneumatic tourniquet   was placed around the left ankle and the left foot was scrubbed and   draped in the usual sterile fashion. After Esmarch exsanguination, the   tourniquet was inflated to 300 mmHg. An elliptical excision was mapped out over the dorsum of the second   toe, centered over the distal interphalangeal joint. The incision was   elliptical. Incision was carried down to bone, and tissue was removed,   exposing the joint. Moderate synovitis and degenerative changes were   noted within the joint, with a large dorsal exostosis on the distal   phalanx. The exostosis was removed with a rongeur. The collateral   ligaments and plantar plate were released from the middle phalangeal   head. The head was removed with a bone-cutting rongeur at the neck   level, making the cut perpendicular to the long axis. The plantar plate   was divided, along with the long flexor tendon. Good correction of the   flexion deformity was obtained.  The wound was irrigated and   stabilization and closure were carried out with a 4-0 nylon suture over Telfa bolsters with a vertical mattress technique. After completion of   the procedure, a sterile compressive postoperative dressing was   applied. The tourniquet was deflated, and the patient was awakened   from anesthesia and taken to the recovery room in satisfactory   condition.         MD Gomez Bernstein / Luz Boles   D:  02/08/2017   09:23   T:  02/08/2017   10:42   Job #:  [id number]

## 2017-02-08 NOTE — BRIEF OP NOTE
o  BRIEF OPERATIVE NOTE    Date of Procedure: 2/8/2017   Preoperative Diagnosis: ARTHRITIS LEFT FOOT  Postoperative Diagnosis: ARTHRITIS LEFT FOOT    Procedure(s):  2ND DISTAL INTERPHALANGEAL JOINT EXCISIONAL ARTHROPLASTY LEFT FOOT (ANKLE BLOCK)  Surgeon(s) and Role:     * Prasanth Munguia MD - Primary            Surgical Staff:  Circ-1: Lissett Renee RN  Circ-Relief: Mallory Claire RN  Scrub Tech-1: Kevin Stock  Surg Asst-1: Sajan Lazaro  Event Time In   Incision Start 0901   Incision Close 0913     Anesthesia: Other   Estimated Blood Loss: 0  Specimens: * No specimens in log *   Findings: 0   Complications: 0  Implants: * No implants in log *

## 2017-02-10 ENCOUNTER — PATIENT OUTREACH (OUTPATIENT)
Dept: INTERNAL MEDICINE CLINIC | Age: 67
End: 2017-02-10

## 2017-02-10 NOTE — PROGRESS NOTES
NNTOCOP    Pt noted on hospital discharge on 2/8/17 from [facility] for a scheduled procedure. Post-discharge call completed by post discharge staff on 2/9/17.

## 2017-02-10 NOTE — PROGRESS NOTES
My chart letter sent to pt to schedule a routine appt with PCP Dr. Srikanth Steen.      Health Maintenance Summary      Hepatitis C Screening Overdue 1950      FOOT EXAM Q1 Overdue 11/12/1958      EYE EXAM RETINAL OR DILATED Q1 Overdue 11/12/1958      ZOSTER VACCINE AGE 60> Overdue 11/12/2008      GLAUCOMA SCREENING Q2Y Overdue 11/12/2013      MEDICARE YEARLY EXAM Overdue 1/7/2017      MICROALBUMIN Q1 Overdue 1/8/2017      LIPID PANEL Q1 Overdue 1/8/2017

## 2017-03-09 ENCOUNTER — OFFICE VISIT (OUTPATIENT)
Dept: INTERNAL MEDICINE CLINIC | Age: 67
End: 2017-03-09

## 2017-03-09 VITALS
HEIGHT: 68 IN | RESPIRATION RATE: 18 BRPM | OXYGEN SATURATION: 98 % | HEART RATE: 56 BPM | SYSTOLIC BLOOD PRESSURE: 143 MMHG | DIASTOLIC BLOOD PRESSURE: 86 MMHG | BODY MASS INDEX: 30.69 KG/M2 | WEIGHT: 202.5 LBS | TEMPERATURE: 98.1 F

## 2017-03-09 DIAGNOSIS — Z71.89 ADVANCED CARE PLANNING/COUNSELING DISCUSSION: ICD-10-CM

## 2017-03-09 DIAGNOSIS — B35.4 TINEA CORPORIS: ICD-10-CM

## 2017-03-09 DIAGNOSIS — Z00.00 MEDICARE ANNUAL WELLNESS VISIT, INITIAL: Primary | ICD-10-CM

## 2017-03-09 DIAGNOSIS — Z13.31 SCREENING FOR DEPRESSION: ICD-10-CM

## 2017-03-09 DIAGNOSIS — E11.9 DIABETES MELLITUS TYPE 2, DIET-CONTROLLED (HCC): ICD-10-CM

## 2017-03-09 DIAGNOSIS — Z11.59 NEED FOR HEPATITIS C SCREENING TEST: ICD-10-CM

## 2017-03-09 DIAGNOSIS — Z23 ENCOUNTER FOR IMMUNIZATION: ICD-10-CM

## 2017-03-09 DIAGNOSIS — Z13.39 SCREENING FOR ALCOHOLISM: ICD-10-CM

## 2017-03-09 DIAGNOSIS — Z12.5 PROSTATE CANCER SCREENING: ICD-10-CM

## 2017-03-09 DIAGNOSIS — E78.00 PURE HYPERCHOLESTEROLEMIA: ICD-10-CM

## 2017-03-09 RX ORDER — CHLORPHENIRAMINE MALEATE 4 MG
TABLET ORAL 2 TIMES DAILY
Qty: 15 G | Refills: 0 | COMMUNITY
Start: 2017-03-09 | End: 2017-12-04 | Stop reason: ALTCHOICE

## 2017-03-09 NOTE — PROGRESS NOTES
Nurse Navigator Medicare Wellness Visit performed by LEXX Murphy    This is an Initial Zach Exam (AWV) (Performed 12 months after IPPE or effective date of Medicare Part B enrollment, Once in a lifetime)    I have reviewed the patient's medical history in detail and updated the computerized patient record. History     Past Medical History:   Diagnosis Date    CAD (coronary artery disease)     hypercholesterolemia    Hypercholesterolemia     Hypertension       Past Surgical History:   Procedure Laterality Date    CARDIAC SURG PROCEDURE UNLIST  11/1996    stent placed x2     HX COLONOSCOPY  07/2011    normal    HX HEENT      wisdom teeth extraction    HX HERNIA REPAIR      inguinal hernia    HX HERNIA REPAIR      bilateral inguinal     Current Outpatient Prescriptions   Medication Sig Dispense Refill    clotrimazole (LOTRIMIN) 1 % topical cream Apply  to affected area two (2) times a day. 15 g 0    pneumococcal 13 barry conj dip (PREVNAR-13) 0.5 mL syrg injection 0.5 mL by IntraMUSCular route once for 1 dose. 0.5 mL 0    flaxseed oil 1,000 mg cap Take 1,000 mg by mouth two (2) times a day.  CHOLECALCIFEROL, VITAMIN D3, (VITAMIN D3 PO) Take 0.5 Tabs by mouth daily.  MULTIVIT &MINERALS/FERROUS FUM (MULTI VITAMIN PO) Take 1 Tab by mouth daily.  naproxen (NAPROSYN) 500 mg tablet Take 1 Tab by mouth two (2) times daily (with meals). (Patient taking differently: Take 500 mg by mouth as needed.) 20 Tab 0    glucose blood VI test strips (FREESTYLE LITE STRIPS) strip Test fasting blood sugar twice daily. Dx E11.9 180 Strip 11    Blood-Glucose Meter (FREESTYLE FREEDOM LITE) monitoring kit For blood sugar testing twice daily. Dx E11.9 1 Kit 0    Lancets (FREESTYLE LANCETS) [facility] For blood sugar testing twice daily. Dx E11.9 200 Each 11    aspirin delayed-release 81 mg tablet Take 1 Tab by mouth daily.        Allergies   Allergen Reactions    Atorvastatin Myalgia forgetfullness    Simvastatin Other (comments)     forgetfullness      Sulfa (Sulfonamide Antibiotics) Unknown (comments)     Patient is unaware    Sulfa (Sulfonamide Antibiotics) Unknown (comments)     Family History   Problem Relation Age of Onset    Hypertension Mother     Cancer Father      prostate    Diabetes Father     Heart Disease Father     Hypertension Father     Heart Disease Sister     Thyroid Disease Sister     Cancer Brother      prostate cancerr    Hypertension Brother     No Known Problems Daughter     No Known Problems Son      Social History   Substance Use Topics    Smoking status: Former Smoker     Packs/day: 0.25     Years: 35.00     Types: Cigarettes     Quit date: 12/19/2012    Smokeless tobacco: Never Used    Alcohol use Yes      Comment: 1/month     Patient Active Problem List   Diagnosis Code    CAD (coronary artery disease) I25.10    Hyperlipidemia E78.5    Advanced care planning/counseling discussion Z71.89    Diabetes mellitus type 2, diet-controlled ([facility] E11.9    Pure hypercholesterolemia E78.00         Depression Risk Factor Screening:   Patient denies feelings of being down, depressed or hopeless at this time. Patient states that they have a strong support system within their family & friends. PHQ 2 / 9, over the last two weeks 3/9/2017   Little interest or pleasure in doing things Not at all   Feeling down, depressed or hopeless Not at all   Total Score PHQ 2 0     Alcohol Risk Factor Screening: On any occasion during the past 3 months, have you had more than 4 drinks containing alcohol? No    Do you average more than 14 drinks per week? No    Functional Ability and Level of Safety:     Hearing Loss   normal-to-mild    Activities of Daily Living   Self-care. Patient states that he lives with his wife in a private residence. Patient states independence in all ADLs & denies the use of assistive devices for ambulation.   NN encouraged patient to continue and/ or introduce routine physical exercise into their daily routine as applicable & as recommended by PCP. Patient verbalized understanding & agreement to take this into consideration. Requires assistance with:   ADL Assessment 3/9/2017   Feeding yourself No Help Needed   Getting from bed to chair No Help Needed   Getting dressed No Help Needed   Bathing or showering No Help Needed   Walk across the room (includes cane/walker) No Help Needed   Using the telphone No Help Needed   Taking your medications No Help Needed   Preparing meals No Help Needed   Managing money (expenses/bills) No Help Needed   Moderately strenuous housework (laundry) No Help Needed   Shopping for personal items (toiletries/medicines) No Help Needed   Shopping for groceries No Help Needed   Driving No Help Needed   Climbing a flight of stairs No Help Needed   Getting to places beyond walking distances No Help Needed       Fall Risk   Patient denies falls within the past year & verbalizes awareness of fall prevention strategies. Fall Risk Assessment, last 12 mths 3/9/2017   Able to walk? Yes   Fall in past 12 months? No     Abuse Screen   Patient is not abused    Review of Systems   Medicare Wellness Visit    Physical Examination     No exam data present    Evaluation of Cognitive Function:  Mood/affect:  happy  Appearance: age appropriate and casually dressed  Family member/caregiver input: Patient's wife present in a supportive manner. No exam performed today, Medicare Wellness Visit.     Patient Care Team:  Agueda Estes MD as PCP - General (Internal Medicine)  Not On File [facility]  Vivek Xiong RN as Ambulatory Care Navigator    Advice/Referrals/Counseling   Education and counseling provided:  End-of-Life planning (with patient's consent)  Pneumococcal Vaccine  Influenza Vaccine  Prostate cancer screening tests (PSA, covered annually)  Colorectal cancer screening tests  Screening for glaucoma  tdap & shingles vaccinations      Assessment/Plan   1. Patient states that a completed Advanced Medical Directive is at home. NN encouraged patient to bring a copy of the completed Advanced Medical Directive to the office for scanning into the medical record. Patient verbalized understanding & agreement. 2. Patient is up to date on the following immunizations: flu vaccine (admin 11/2016), tdap vaccine (admin 1/2014), pneumonia 23 vaccine (admin 12/2012). Patient confirmed the aforementioned preventative immunization dates are correct. Patient reports a case of shingles in the past; therefore, he denies receiving a shingles vaccine. Patient is due for Prevnar 13. Patient is aware that Prevnar 13 can be given at their local pharmacy with a prescription from their PCP. Patient verbalized understanding. PCP notified. Today, PCP provided patient with a Prevnar 13 vaccination prescription. Patient's health maintenance immunization record has been updated & is current. 3. Patient states that he follows his PCP's recommendations for PSA screenings (report on file from 1/2016) & Colonoscopy screenings (report on file from 7/6/2011 performed by Dr. Temo Pena at [facility] with recommended follow-up screening in 10 years, 2021). Patient confirmed the aforementioned preventative screening dates. Today, PCP provided patient with orders for PSA screening & Hep C screening tests. Patient completed diabetes education training in February & March of 2016.    4. Patient wears corrective lenses. Patient reports having a routine eye exam & glaucoma screening within the last two years performed by Dr. Katja Candelaria at [facility]. LYNNE faxed requesting a copy of patient's last eye exam with glaucoma screening with patient's verbal approval. Patient states that he will be changing to [facility] for his next routine eye exam.    Patient verbalized understanding of all information discussed.  Patient was given the opportunity to ask questions. Medication reconciliation completed by MA/ LPN and reviewed by PCP. Patient provided AVS which includes Medicare Wellness Preventative Screening Table.

## 2017-03-09 NOTE — PATIENT INSTRUCTIONS
Medicare Part B Preventive Services Guidelines/Limitations Date last completed and Frequency Due Date   Cardiovascular Screening Blood Tests (every 5 years)  Total cholesterol, HDL, Triglycerides Order as a panel if possible Completed 1/2016    As recommended by your PCP As recommended by your PCP or Specialist   Colorectal Cancer Screening  -Fecal occult blood test (annual)  -Flexible sigmoidoscopy (5y)  -Screening colonoscopy (10y)  -Barium Enema Age 49-80; After age [de-identified] if history of abnormal results Completed 7/6/2011     Recommended follow-up in 10 years  As recommended by your PCP or Specialist     Counseling to Prevent Tobacco Use (up to 8 sessions per year)  - Counseling greater than 3 and up to 10 minutes  - Counseling greater than 10 minutes Patients must be asymptomatic of tobacco-related conditions to receive as preventive service N/A N/A   Diabetes Screening Tests (at least every 3 years, Medicare covers annually or at 6-month intervals for prediabetic patients)    Fasting blood sugar (FBS) or glucose tolerance test (GTT) Patient must be diagnosed with one of the following:  -Hypertension, Dyslipidemia, obesity, previous impaired FBS or GTT  Or any two of the following: overweight, FH of diabetes, age ? 72, history of gestational diabetes, birth of baby weighing more than 9 pounds Completed 11/2016    Recommended every 3-6 months for Pre-Diabetics and Diabetics As recommended by your PCP or Specialist     Glaucoma Screening (no USPSTF recommendation) Diabetes mellitus, family history, , age 48 or over,  American, age 72 or over Completed within the last 2 years    Recommended annually As recommended by your PCP or Specialist   Prostate Cancer Screening (annually up to age 76)  - Digital rectal exam (LEAH)  - Prostate specific antigen (PSA) Annually (age 48 or over), LEAH not paid separately when covered E/M service is provided on same date Completed 1/2016    Recommended annually to age 76 As recommended by your PCP or Specialist     Seasonal Influenza Vaccination (annually)  Completed 11/2016    Recommended Annually Completed for 2016 flu season   TDAP Vaccination  Completed 1/2014    Recommended every 10 years As recommended by your PCP or Specialist    Zoster (Shingles) Vaccination Covered by Medicare Part D through the pharmacy- PCP provides prescription Completed     Recommended once over age 48  Patient reports a case of shingles many years ago   Pneumococcal Vaccination (once after 72)  Pneumo 23- 12/2012  Recommended once over the age of 72    Prevnar 15-  Recommended once over the age of 72 Complete        You are due for a Prevnar 13 vaccine. You can get this at your local pharmacy with a prescription from your PCP. Ultrasound Screening for Abdominal Aortic Aneurysm (AAA) (once) Patient must be referred through IPPE and not have had a screening for abdominal aortic aneurysm before under Medicare. Limited to patients who meet one of the following criteria:  - Men who are 73-68 years old and have smoked more than 100 cigarettes in their lifetime.  -Anyone with a FH of AAA  -Anyone recommended for screening by USPSTF Not indicated unless recommended by PCP   Not indicated unless recommended by PCP     Family Practice Management 2011    Please bring a copy of your completed advance medical directive to the office so it may be added to your medical record. Thank you. If you have any questions or concerns please feel free to contact me at [phone]. It was a pleasure meeting you today and participating in your healthcare.   Saad [facility] RN

## 2017-03-09 NOTE — MR AVS SNAPSHOT
Visit Information Date & Time Provider Department Dept. Phone Encounter #  
 3/9/2017  3:00 PM Guillermo Laird MD [facility] [address] [id number] Follow-up Instructions Return in about 6 months (around 9/9/2017). Upcoming Health Maintenance Date Due Hepatitis C Screening 1950 FOOT EXAM Q1 11/12/1958 EYE EXAM RETINAL OR DILATED Q1 11/12/1958 ZOSTER VACCINE AGE 60> 11/12/2008 GLAUCOMA SCREENING Q2Y 11/12/2013 MICROALBUMIN Q1 1/8/2017 LIPID PANEL Q1 1/8/2017 HEMOGLOBIN A1C Q6M 5/15/2017 Pneumococcal 65+ Low/Medium Risk (2 of 2 - PPSV23) 12/19/2017 MEDICARE YEARLY EXAM 3/10/2018 COLONOSCOPY 7/6/2021 DTaP/Tdap/Td series (2 - Td) 1/1/2024 Allergies as of 3/9/2017  Review Complete On: 3/9/2017 By: Macarena Miller LPN Severity Noted Reaction Type Reactions Atorvastatin  02/23/2015    Myalgia  
 forgetfullness Simvastatin  02/23/2015    Other (comments)  
 forgetfullness Sulfa (Sulfonamide Antibiotics)  07/05/2011    Unknown (comments) Patient is unaware Sulfa (Sulfonamide Antibiotics)  12/19/2013   Side Effect Unknown (comments) Current Immunizations  Reviewed on 12/19/2013 Name Date Influenza High Dose Vaccine PF 11/15/2016 Pneumococcal Vaccine (Unspecified Type) 12/19/2012 Tdap 1/1/2014 Not reviewed this visit You Were Diagnosed With   
  
 Codes Comments Medicare annual wellness visit, initial    -  Primary ICD-10-CM: Z00.00 ICD-9-CM: V70.0 Tinea corporis     ICD-10-CM: B35.4 ICD-9-CM: 110.5 Encounter for immunization     ICD-10-CM: D63 ICD-9-CM: V03.89 Need for hepatitis C screening test     ICD-10-CM: Z11.59 
ICD-9-CM: V73.89 Diabetes mellitus type 2, diet-controlled ([facility]     ICD-10-CM: E11.9 ICD-9-CM: 250.00 Pure hypercholesterolemia     ICD-10-CM: E78.00 ICD-9-CM: 272.0 Prostate cancer screening     ICD-10-CM: Z12.5 ICD-9-CM: V76.44 Vitals BP Pulse Temp Resp Height(growth percentile) Weight(growth percentile) 143/86 (BP 1 Location: Left arm, BP Patient Position: Sitting) (!) 56 98.1 °F (36.7 °C) (Oral) 18 5' 8\" (1.727 m) 202 lb 8 oz (91.9 kg) SpO2 BMI Smoking Status 98% 30.79 kg/m2 Former Smoker Vitals History BMI and BSA Data Body Mass Index Body Surface Area 30.79 kg/m 2 2.1 m 2 Preferred Pharmacy Pharmacy Name Phone [facility] Formerly named [facility] [address] [facility], [address] [place] [address], [address] [phone] Your Updated Medication List  
  
   
This list is accurate as of: 3/9/17  3:37 PM.  Always use your most recent med list.  
  
  
  
  
 aspirin delayed-release 81 mg tablet Take 1 Tab by mouth daily. Blood-Glucose Meter monitoring kit Commonly known as:  FREESTYLE FREEDOM LITE For blood sugar testing twice daily. Dx E11.9  
  
 clotrimazole 1 % topical cream  
Commonly known as:  Cr[place]y [place] Apply  to affected area two (2) times a day. flaxseed oil 1,000 mg Cap Take 1,000 mg by mouth two (2) times a day. glucose blood VI test strips strip Commonly known as:  FREESTYLE LITE STRIPS Test fasting blood sugar twice daily. Dx E11.9 Lancets [facility] Commonly known as:  FREESTYLE LANCETS For blood sugar testing twice daily. Dx E11.9 MULTI VITAMIN PO Take 1 Tab by mouth daily. naproxen 500 mg tablet Commonly known as:  NAPROSYN Take 1 Tab by mouth two (2) times daily (with meals). pneumococcal 13 barry conj dip 0.5 mL Syrg injection Commonly known as:  PREVNAR-13  
0.5 mL by IntraMUSCular route once for 1 dose. VITAMIN D3 PO Take 0.5 Tabs by mouth daily. Prescriptions Printed Refills  
 pneumococcal 13 barry conj dip (PREVNAR-13) 0.5 mL syrg injection 0 Si.5 mL by IntraMUSCular route once for 1 dose. Class: Print Route: IntraMUSCular We Performed the Following HEMOGLOBIN A1C WITH EAG [12058 CPT(R)] HEPATITIS C AB [91120 CPT(R)] LIPID PANEL [19387 CPT(R)] MICROALBUMIN, UR, RAND W/ MICROALBUMIN/CREA RATIO [id number] CPT(R)] PROSTATE SPECIFIC AG (PSA) [id number] CPT(R)] Follow-up Instructions Return in about 6 months (around 9/9/2017). Patient Instructions Medicare Part B Preventive Services Guidelines/Limitations Date last completed and Frequency Due Date Cardiovascular Screening Blood Tests (every 5 years) Total cholesterol, HDL, Triglycerides Order as a panel if possible Completed 1/2016 As recommended by your PCP As recommended by your PCP or Specialist  
Colorectal Cancer Screening 
-Fecal occult blood test (annual) -Flexible sigmoidoscopy (5y) 
-Screening colonoscopy (10y) -Barium Enema Age 49-80; After age [de-identified] if history of abnormal results Completed 7/6/2011 Recommended follow-up in 10 years  As recommended by your PCP or Specialist 
  
Counseling to Prevent Tobacco Use (up to 8 sessions per year) - Counseling greater than 3 and up to 10 minutes - Counseling greater than 10 minutes Patients must be asymptomatic of tobacco-related conditions to receive as preventive service N/A N/A Diabetes Screening Tests (at least every 3 years, Medicare covers annually or at 6-month intervals for prediabetic patients) Fasting blood sugar (FBS) or glucose tolerance test (GTT) Patient must be diagnosed with one of the following: 
-Hypertension, Dyslipidemia, obesity, previous impaired FBS or GTT 
Or any two of the following: overweight, FH of diabetes, age ? 72, history of gestational diabetes, birth of baby weighing more than 9 pounds Completed 11/2016 Recommended every 3-6 months for Pre-Diabetics and Diabetics As recommended by your PCP or Specialist 
  
Glaucoma Screening (no USPSTF recommendation) Diabetes mellitus, family history, , age 48 or over,  American, age 72 or over Completed within the last 2 years Recommended annually As recommended by your PCP or Specialist  
Prostate Cancer Screening (annually up to age 76) - Digital rectal exam (LEAH) - Prostate specific antigen (PSA) Annually (age 48 or over), LEAH not paid separately when covered E/M service is provided on same date Completed 1/2016 Recommended annually to age 76 As recommended by your PCP or Specialist 
  
Seasonal Influenza Vaccination (annually)  Completed 11/2016 Recommended Annually Completed for 2016 flu season TDAP Vaccination  Completed 1/2014 Recommended every 10 years As recommended by your PCP or Specialist   
Zoster (Shingles) Vaccination Covered by Medicare Part D through the pharmacy- PCP provides prescription Completed Recommended once over age 48  Patient reports a case of shingles many years ago Pneumococcal Vaccination (once after 65)  Pneumo 23- 12/2012 Recommended once over the age of 72 Prevnar 13-  Recommended once over the age of 72 Complete You are due for a Prevnar 13 vaccine. You can get this at your local pharmacy with a prescription from your PCP. Ultrasound Screening for Abdominal Aortic Aneurysm (AAA) (once) Patient must be referred through IPPE and not have had a screening for abdominal aortic aneurysm before under Medicare. Limited to patients who meet one of the following criteria: 
- Men who are 73-68 years old and have smoked more than 100 cigarettes in their lifetime. 
-Anyone with a FH of AAA 
-Anyone recommended for screening by USPSTF Not indicated unless recommended by PCP Not indicated unless recommended by PCP Family Practice Management 2011 Please bring a copy of your completed advance medical directive to the office so it may be added to your medical record. Thank you. If you have any questions or concerns please feel free to contact me at [phone].   It was a pleasure meeting you today and participating in your healthcare. Jose Sinclair RN Introducing [facility]! Dear Bharathi Parr: Thank you for requesting a WeAreHolidays account. Our records indicate that you already have an active WeAreHolidays account. You can access your account anytime at [email or web address] Did you know that you can access your hospital and ER discharge instructions at any time in WeAreHolidays? You can also review all of your test results from your hospital stay or ER visit. Additional Information If you have questions, please visit the Frequently Asked Questions section of the WeAreHolidays website at [email or web address] Remember, WeAreHolidays is NOT to be used for urgent needs. For medical emergencies, dial 911. Now available from your iPhone and Android! Please provide this summary of care documentation to your next provider. Your primary care clinician is listed as Keisha Stephen. If you have any questions after today's visit, please call [phone].

## 2017-03-09 NOTE — PROGRESS NOTES
HISTORY OF PRESENT ILLNESS  Tessie Roy is a 74 y.o. male. HPI  Rash:  he presents with rash/ skin problem located on waist,  . Onset of skin problem was 2 months ago. Itching: yes. Flaking or scaling:no. Pain: no .  Weeping/ draining:  no.   Exposures: no  Medications tried include:  cortisone and was effective. he does not have a history of similar rash    Diabetes:  He is here for follow up of diabetes. Proteinuria: no  Neuropathy: no  Medication change since last visit:  Not applicable   Diabetic Review of Systems - . Lab Results   Component Value Date/Time    Hemoglobin A1c 6.6 09/05/2014 08:14 AM    Hemoglobin A1c (POC) 6.3 11/15/2016 08:10 AM     Lab Results   Component Value Date/Time    Microalb/Creat ratio (ug/mg creat.) 6.5 01/08/2016 09:07 AM    Microalbumin, urine 8.4 01/08/2016 09:07 AM         Last Point of Care HGB A1C  Hemoglobin A1c (POC)   Date Value Ref Range Status   11/15/2016 6.3 % Final        Hyperlipidemia:  Tessie Roy is following up on his dyslipidemia. Cardiovascular risks for him are: LDL goal is under 100. Currently he takes  , nothing  Lab Results   Component Value Date/Time    Cholesterol, total 203 01/08/2016 09:07 AM    Cholesterol, total 200 09/05/2014 08:14 AM    Cholesterol, total 213 12/20/2013 09:32 AM    HDL Cholesterol 37 01/08/2016 09:07 AM    HDL Cholesterol 38 09/05/2014 08:14 AM    HDL Cholesterol 34 12/20/2013 09:32 AM    LDL, calculated 132 01/08/2016 09:07 AM    LDL, calculated 132 09/05/2014 08:14 AM    LDL, calculated 131 12/20/2013 09:32 AM    Triglyceride 170 01/08/2016 09:07 AM    Triglyceride 149 09/05/2014 08:14 AM    Triglyceride 241 12/20/2013 09:32 AM     Lab Results   Component Value Date/Time    ALT (SGPT) 19 09/05/2014 08:14 AM    AST (SGOT) 18 09/05/2014 08:14 AM    Alk.  phosphatase 76 09/05/2014 08:14 AM    Bilirubin, total 0.4 09/05/2014 08:14 AM             ROS    Physical Exam   Abdominal:       2 almost annular 4-5 cm patches with central clearing and erythematous advancing edges. Visit Vitals    /86 (BP 1 Location: Left arm, BP Patient Position: Sitting)    Pulse (!) 56    Temp 98.1 °F (36.7 °C) (Oral)    Resp 18    Ht 5' 8\" (1.727 m)    Wt 202 lb 8 oz (91.9 kg)    SpO2 98%    BMI 30.79 kg/m2       ASSESSMENT and PLAN    ICD-10-CM ICD-9-CM    1. Medicare annual wellness visit, initial  Fidel Brown received a complete medicare wellness assessment today by Kirk Jimenes RN. I've reviewed her assessment note and all screening/preventative plans addressed. I also addressed all questions and concerns surrounding the assessment and plans with Fidel Brown. Z00.00 V70.0    2. Tinea corporis B35.4 110.5 clotrimazole (LOTRIMIN) 1 % topical cream   3. Encounter for immunization Z23 V03.89 pneumococcal 13 barry conj dip (PREVNAR-13) 0.5 mL syrg injection   4. Need for hepatitis C screening test Z11.59 V73.89 HEPATITIS C AB   5. Diabetes mellitus type 2, diet-controlled (HCC) E11.9 250.00 HEMOGLOBIN A1C WITH EAG      MICROALBUMIN, UR, RAND W/ MICROALBUMIN/CREA RATIO   6. Pure hypercholesterolemia E78.00 272.0 LIPID PANEL   7. Prostate cancer screening Z12.5 V76.44 PROSTATE SPECIFIC AG (PSA)   bp mildly elevated today. Log blood pressures at home while sitting, relaxed 3-5 times weekly and bring to next visit. Pt educated on goal BP of 130/80 on average or lower. Call office as soon as possible if BP's over 140/90 or below 110/50 on multiple occasions and/or with symptoms of dizziness, chest pain, shortness of breath, headache or ankle swelling. Recheck log and bp here in 6 month(s). Follow-up Disposition:  Return in about 6 months (around 9/9/2017).

## 2017-03-11 LAB
ALBUMIN/CREAT UR: 10 MG/G CREAT (ref 0–30)
CHOLEST SERPL-MCNC: 227 MG/DL (ref 100–199)
CREAT UR-MCNC: 98.6 MG/DL
EST. AVERAGE GLUCOSE BLD GHB EST-MCNC: 151 MG/DL
HBA1C MFR BLD: 6.9 % (ref 4.8–5.6)
HCV AB S/CO SERPL IA: <0.1 S/CO RATIO (ref 0–0.9)
HDLC SERPL-MCNC: 38 MG/DL
INTERPRETATION, 910389: NORMAL
LDLC SERPL CALC-MCNC: 142 MG/DL (ref 0–99)
Lab: NORMAL
MICROALBUMIN UR-MCNC: 9.9 UG/ML
PSA SERPL-MCNC: 1.2 NG/ML (ref 0–4)
TRIGL SERPL-MCNC: 235 MG/DL (ref 0–149)
VLDLC SERPL CALC-MCNC: 47 MG/DL (ref 5–40)

## 2017-12-04 ENCOUNTER — HOSPITAL ENCOUNTER (OUTPATIENT)
Dept: LAB | Age: 67
Discharge: HOME | End: 2017-12-04
Payer: MEDICARE

## 2017-12-04 ENCOUNTER — OFFICE VISIT (OUTPATIENT)
Dept: INTERNAL MEDICINE CLINIC | Age: 67
End: 2017-12-04

## 2017-12-04 VITALS
OXYGEN SATURATION: 97 % | WEIGHT: 195 LBS | SYSTOLIC BLOOD PRESSURE: 148 MMHG | HEART RATE: 56 BPM | RESPIRATION RATE: 18 BRPM | HEIGHT: 68 IN | DIASTOLIC BLOOD PRESSURE: 88 MMHG | TEMPERATURE: 98.5 F | BODY MASS INDEX: 29.55 KG/M2

## 2017-12-04 DIAGNOSIS — I25.10 CORONARY ARTERY DISEASE INVOLVING NATIVE CORONARY ARTERY OF NATIVE HEART WITHOUT ANGINA PECTORIS: Chronic | ICD-10-CM

## 2017-12-04 DIAGNOSIS — R03.0 ELEVATED BP WITHOUT DIAGNOSIS OF HYPERTENSION: Primary | ICD-10-CM

## 2017-12-04 DIAGNOSIS — Z23 ENCOUNTER FOR IMMUNIZATION: ICD-10-CM

## 2017-12-04 DIAGNOSIS — Z01.818 PREOP GENERAL PHYSICAL EXAM: ICD-10-CM

## 2017-12-04 DIAGNOSIS — M18.11 OSTEOARTHRITIS OF RIGHT THUMB: ICD-10-CM

## 2017-12-04 PROCEDURE — 85025 COMPLETE CBC W/AUTO DIFF WBC: CPT

## 2017-12-04 PROCEDURE — 80048 BASIC METABOLIC PNL TOTAL CA: CPT

## 2017-12-04 RX ORDER — NAPROXEN 500 MG/1
500 TABLET ORAL
COMMUNITY
End: 2018-08-28 | Stop reason: ALTCHOICE

## 2017-12-05 LAB
BASOPHILS # BLD AUTO: 0 X10E3/UL (ref 0–0.2)
BASOPHILS NFR BLD AUTO: 0 %
BUN SERPL-MCNC: 12 MG/DL (ref 8–27)
BUN/CREAT SERPL: 15 (ref 10–24)
CALCIUM SERPL-MCNC: 9.4 MG/DL (ref 8.6–10.2)
CHLORIDE SERPL-SCNC: 102 MMOL/L (ref 96–106)
CO2 SERPL-SCNC: 27 MMOL/L (ref 18–29)
CREAT SERPL-MCNC: 0.78 MG/DL (ref 0.76–1.27)
EOSINOPHIL # BLD AUTO: 0.3 X10E3/UL (ref 0–0.4)
EOSINOPHIL NFR BLD AUTO: 5 %
ERYTHROCYTE [DISTWIDTH] IN BLOOD BY AUTOMATED COUNT: 13.9 % (ref 12.3–15.4)
GFR SERPLBLD CREATININE-BSD FMLA CKD-EPI: 106 ML/MIN/1.73
GFR SERPLBLD CREATININE-BSD FMLA CKD-EPI: 92 ML/MIN/1.73
GLUCOSE SERPL-MCNC: 157 MG/DL (ref 65–99)
HCT VFR BLD AUTO: 43.2 % (ref 37.5–51)
HGB BLD-MCNC: 14.4 G/DL (ref 13–17.7)
IMM GRANULOCYTES # BLD: 0 X10E3/UL (ref 0–0.1)
IMM GRANULOCYTES NFR BLD: 0 %
LYMPHOCYTES # BLD AUTO: 1.9 X10E3/UL (ref 0.7–3.1)
LYMPHOCYTES NFR BLD AUTO: 28 %
MCH RBC QN AUTO: 31 PG (ref 26.6–33)
MCHC RBC AUTO-ENTMCNC: 33.3 G/DL (ref 31.5–35.7)
MCV RBC AUTO: 93 FL (ref 79–97)
MONOCYTES # BLD AUTO: 0.5 X10E3/UL (ref 0.1–0.9)
MONOCYTES NFR BLD AUTO: 7 %
NEUTROPHILS # BLD AUTO: 4.2 X10E3/UL (ref 1.4–7)
NEUTROPHILS NFR BLD AUTO: 60 %
PLATELET # BLD AUTO: 220 X10E3/UL (ref 150–379)
POTASSIUM SERPL-SCNC: 4.5 MMOL/L (ref 3.5–5.2)
RBC # BLD AUTO: 4.65 X10E6/UL (ref 4.14–5.8)
SODIUM SERPL-SCNC: 141 MMOL/L (ref 134–144)
WBC # BLD AUTO: 7 X10E3/UL (ref 3.4–10.8)

## 2017-12-08 ENCOUNTER — TELEPHONE (OUTPATIENT)
Dept: INTERNAL MEDICINE CLINIC | Age: 67
End: 2017-12-08

## 2017-12-08 NOTE — TELEPHONE ENCOUNTER
Dr. Connolly's office called and needs the paperwork for the pt to have surgery or a statement that the pt is ok to have surgery. They also need the ekg.   Fax to [phone]

## 2018-06-29 ENCOUNTER — OFFICE VISIT (OUTPATIENT)
Dept: INTERNAL MEDICINE CLINIC | Age: 68
End: 2018-06-29

## 2018-06-29 VITALS
SYSTOLIC BLOOD PRESSURE: 129 MMHG | RESPIRATION RATE: 14 BRPM | DIASTOLIC BLOOD PRESSURE: 63 MMHG | HEIGHT: 68 IN | OXYGEN SATURATION: 96 % | TEMPERATURE: 97.9 F | WEIGHT: 191.2 LBS | BODY MASS INDEX: 28.98 KG/M2 | HEART RATE: 57 BPM

## 2018-06-29 DIAGNOSIS — L02.32 FURUNCLE OF BUTTOCK: Primary | ICD-10-CM

## 2018-06-29 DIAGNOSIS — E11.9 DIABETES MELLITUS TYPE 2, DIET-CONTROLLED (HCC): ICD-10-CM

## 2018-06-29 RX ORDER — CEPHALEXIN 500 MG/1
500 CAPSULE ORAL 3 TIMES DAILY
Qty: 21 CAP | Refills: 0 | Status: SHIPPED | OUTPATIENT
Start: 2018-06-29 | End: 2018-08-28 | Stop reason: ALTCHOICE

## 2018-06-29 RX ORDER — MUPIROCIN CALCIUM 20 MG/G
CREAM TOPICAL 2 TIMES DAILY
Qty: 15 G | Refills: 0 | Status: SHIPPED | OUTPATIENT
Start: 2018-06-29 | End: 2020-10-23

## 2018-06-29 NOTE — PROGRESS NOTES
HISTORY OF PRESENT ILLNESS  Xin Gruber is a 69 y.o. male. HPI  Presents with complaints of tender boil in between buttock cheeks that he noticed initially several days ago; has become gradually more tender and enlarged and he is having discomfort when sitting. Has not noted any drainage from area. Denies fever, chills. He has been diet controlled diabetic but is due for follow up chronic issues and is planning to make appointment for Annual wellness visit with Dr Dimitrios Saunders. Has been trying to adhere to better diet. Review of Systems   Constitutional: Negative for chills, fever and malaise/fatigue. HENT: Negative for congestion. Respiratory: Negative for cough. Cardiovascular: Negative for chest pain and palpitations. Gastrointestinal: Negative for nausea and vomiting. Musculoskeletal: Negative for myalgias. Skin: Positive for rash. Negative for itching. Neurological: Negative for dizziness and headaches. /63 (BP 1 Location: Left arm, BP Patient Position: Sitting)  Pulse (!) 57  Temp 97.9 °F (36.6 °C) (Oral)   Resp 14  Ht 5' 8\" (1.727 m)  Wt 191 lb 3.2 oz (86.7 kg)  SpO2 96%  BMI 29.07 kg/m2  Physical Exam   Constitutional: He is oriented to person, place, and time. He appears well-developed and well-nourished. HENT:   Head: Normocephalic and atraumatic. Neck: Normal range of motion. Neck supple. Cardiovascular: Normal rate and regular rhythm. Pulmonary/Chest: Effort normal and breath sounds normal.   Neurological: He is alert and oriented to person, place, and time. Skin: Lesion noted. Erythematous pustule with 4 cm circular area of erythema to surrounding skin to left buttock/gluteal cleft   Psychiatric: He has a normal mood and affect. His behavior is normal.   Nursing note and vitals reviewed. ASSESSMENT and PLAN  Diagnoses and all orders for this visit:    1.  Furuncle of buttock -- apply warm compresses to area to localize infection  - cephALEXin (KEFLEX) 500 mg capsule; Take 1 Cap by mouth three (3) times daily.  -     mupirocin calcium (BACTROBAN) 2 % topical cream; Apply  to affected area two (2) times a day. 2. Diabetes mellitus type 2, diet-controlled ([address] -- advised to make followup appointment for fasting labs.             lab results and schedule of future lab studies reviewed with patient  reviewed diet, exercise and weight control  reviewed medications and side effects in detail

## 2018-06-29 NOTE — PATIENT INSTRUCTIONS
Epidermoid Cyst: Care Instructions  Your Care Instructions  An epidermoid (say \"cs-dlg-FQT-arnulfo\") cyst is a lump just under the skin. These cysts can form when a hair follicle becomes blocked. They are common in acne and may occur on the face, neck, back, and genitals. However, they can form anywhere on the body. These cysts are not cancer and do not lead to cancer. They tend not to hurt, but they can sometimes become swollen and painful. They also may break open (rupture) and cause scarring. These cysts sometimes do not cause problems and may not need treatment. If you have a cyst that is swollen and hurts, your doctor may inject it with a medicine to help it heal. But it is more likely that a painful cyst will need to be removed. Your doctor will give you a shot of numbing medicine and cut into the cyst to drain it or remove it. This makes the symptoms go away. Follow-up care is a key part of your treatment and safety. Be sure to make and go to all appointments, and call your doctor if you are having problems. It's also a good idea to know your test results and keep a list of the medicines you take. How can you care for yourself at home? · Do not squeeze the cyst or poke it with a needle to open it. This can cause swelling, redness, and infection. · Always have a doctor look at any new lumps you get to make sure that they are not serious. When should you call for help? Watch closely for changes in your health, and be sure to contact your doctor if:  ? · You have a fever, redness, or swelling after you get a shot of medicine in the cyst.   ? · You see or feel a new lump on your skin. Where can you learn more? Go to http://french-hailey.info/. Enter Q274 in the search box to learn more about \"Epidermoid Cyst: Care Instructions. \"  Current as of: October 13, 2016  Content Version: 11.4  © 1529-8687 Healthwise, gantto.  Care instructions adapted under license by [employer] (which disclaims liability or warranty for this information). If you have questions about a medical condition or this instruction, always ask your healthcare professional. [address] any warranty or liability for your use of this information.

## 2018-06-29 NOTE — MR AVS SNAPSHOT
[address] [place] [address] 
 
 
 [address] [place] [address] 
[phone] Patient: Dominga Espinoza MRN: [id number] :1950 Visit Information Date & Time Provider Department Dept. Phone Encounter #  
 2018  1:00 PM Nell Hayes NP [facility] [address] [id number] Upcoming Health Maintenance Date Due  
 FOOT EXAM Q1 1960 EYE EXAM RETINAL OR DILATED Q1 1958 ZOSTER VACCINE AGE 60> 2008 GLAUCOMA SCREENING Q2Y 2013 HEMOGLOBIN A1C Q6M 9/10/2017 Pneumococcal 65+ Low/Medium Risk (2 of 2 - PPSV23) 2017 MICROALBUMIN Q1 3/10/2018 LIPID PANEL Q1 3/10/2018 MEDICARE YEARLY EXAM 3/14/2018 Influenza Age 5 to Adult 2018 COLONOSCOPY 2021 DTaP/Tdap/Td series (2 - Td) 2024 Allergies as of 2018  Review Complete On: 2018 By: Nell Hayes NP Severity Noted Reaction Type Reactions Atorvastatin  2015    Myalgia  
 forgetfullness Simvastatin  2015    Other (comments)  
 forgetfullness Sulfa (Sulfonamide Antibiotics)  2011    Unknown (comments) Patient is unaware Current Immunizations  Reviewed on 2018 Name Date Influenza High Dose Vaccine PF 2017, 11/15/2016 Pneumococcal Vaccine (Unspecified Type) 2012 Tdap 2014 Reviewed by Nell Hayes NP on 2018 at  1:25 PM  
You Were Diagnosed With   
  
 Codes Comments Furuncle of buttock    -  Primary ICD-10-CM: L02.32 
ICD-9-CM: 680.5 Diabetes mellitus type 2, diet-controlled ([facility]     ICD-10-CM: E11.9 ICD-9-CM: 250.00 Vitals BP Pulse Temp Resp Height(growth percentile) Weight(growth percentile) 129/63 (BP 1 Location: Left arm, BP Patient Position: Sitting) (!) 57 97.9 °F (36.6 °C) (Oral) 14 5' 8\" (1.727 m) 191 lb 3.2 oz (86.7 kg) SpO2 BMI Smoking Status 96% 29.07 kg/m2 Former Smoker BMI and BSA Data Body Mass Index Body Surface Area  
 29.07 kg/m 2 2.04 m 2 Preferred Pharmacy Pharmacy Name Phone [facility] [address] [place], [address] [phone] Your Updated Medication List  
  
   
This list is accurate as of 6/29/18  1:31 PM.  Always use your most recent med list.  
  
  
  
  
 aspirin delayed-release 81 mg tablet Take 1 Tab by mouth daily. Blood-Glucose Meter monitoring kit Commonly known as:  FREESTYLE FREEDOM LITE For blood sugar testing twice daily. Dx E11.9 cephALEXin 500 mg capsule Commonly known as:  Ralene Plum Take 1 Cap by mouth three (3) times daily. flaxseed oil 1,000 mg Cap Take 1,000 mg by mouth two (2) times a day. glucose blood VI test strips strip Commonly known as:  FREESTYLE LITE STRIPS Test fasting blood sugar twice daily. Dx E11.9 Lancets [facility] Commonly known as:  FREESTYLE LANCETS For blood sugar testing twice daily. Dx E11.9  
  
 mupirocin calcium 2 % topical cream  
Commonly known as:  [facility] Apply  to affected area two (2) times a day. NAPROSYN 500 mg tablet Generic drug:  naproxen Take 500 mg by mouth two (2) times daily as needed. VITAMIN D3 PO Take 1 Tab by mouth daily. Prescriptions Sent to Pharmacy Refills  
 cephALEXin (KEFLEX) 500 mg capsule 0 Sig: Take 1 Cap by mouth three (3) times daily. Class: Normal  
 Pharm[facility]: [facility] [id number] [place] [address] Ph #: [phone] Route: Oral  
 mupirocin calcium (BACTROBAN) 2 % topical cream 0 Sig: Apply  to affected area two (2) times a day. Class: Normal  
 Pharmacy: [facility] [id number] [place] [address] Ph #: [phone] Route: Topical  
  
Patient Instructions Epidermoid Cyst: Care Instructions Your Care Instructions An epidermoid (say \"fv-cge-GAP-arnulfo\") cyst is a lump just under the skin. These cysts can form when a hair follicle becomes blocked. They are common in acne and may occur on the face, neck, back, and genitals. However, they can form anywhere on the body. These cysts are not cancer and do not lead to cancer. They tend not to hurt, but they can sometimes become swollen and painful. They also may break open (rupture) and cause scarring. These cysts sometimes do not cause problems and may not need treatment. If you have a cyst that is swollen and hurts, your doctor may inject it with a medicine to help it heal. But it is more likely that a painful cyst will need to be removed. Your doctor will give you a shot of numbing medicine and cut into the cyst to drain it or remove it. This makes the symptoms go away. Follow-up care is a key part of your treatment and safety. Be sure to make and go to all appointments, and call your doctor if you are having problems. It's also a good idea to know your test results and keep a list of the medicines you take. How can you care for yourself at home? · Do not squeeze the cyst or poke it with a needle to open it. This can cause swelling, redness, and infection. · Always have a doctor look at any new lumps you get to make sure that they are not serious. When should you call for help? Watch closely for changes in your health, and be sure to contact your doctor if: 
? · You have a fever, redness, or swelling after you get a shot of medicine in the cyst.  
? · You see or feel a new lump on your skin. Where can you learn more? Go to http://french-hailey.info/. Enter R598 in the search box to learn more about \"Epidermoid Cyst: Care Instructions. \" Current as of: October 13, 2016 Content Version: 11.4 © 5099-4471 Toutpost.  Care instructions adapted under license by [employer] (which disclaims liability or warranty for this information). If you have questions about a medical condition or this instruction, always ask your healthcare professional. [address] any warranty or liability for your use of this information. Introducing [facility]! Dear Trevon Garrett: Thank you for requesting a Koalify account. Our records indicate that you already have an active Koalify account. You can access your account anytime at [email or web address] Did you know that you can access your hospital and ER discharge instructions at any time in Koalify? You can also review all of your test results from your hospital stay or ER visit. Additional Information If you have questions, please visit the Frequently Asked Questions section of the Koalify website at [email or web address] Remember, Koalify is NOT to be used for urgent needs. For medical emergencies, dial 911. Now available from your iPhone and Android! Please provide this summary of care documentation to your next provider. Your primary care clinician is listed as Rojelio Knapp. If you have any questions after today's visit, please call [phone].

## 2018-08-28 ENCOUNTER — HOSPITAL ENCOUNTER (OUTPATIENT)
Dept: LAB | Age: 68
Discharge: HOME | End: 2018-08-28
Payer: MEDICARE

## 2018-08-28 ENCOUNTER — OFFICE VISIT (OUTPATIENT)
Dept: INTERNAL MEDICINE CLINIC | Age: 68
End: 2018-08-28

## 2018-08-28 VITALS
SYSTOLIC BLOOD PRESSURE: 132 MMHG | RESPIRATION RATE: 18 BRPM | DIASTOLIC BLOOD PRESSURE: 80 MMHG | TEMPERATURE: 98.3 F | HEIGHT: 68 IN | HEART RATE: 49 BPM | OXYGEN SATURATION: 98 % | BODY MASS INDEX: 28.36 KG/M2 | WEIGHT: 187.13 LBS

## 2018-08-28 DIAGNOSIS — Z13.31 DEPRESSION SCREEN: ICD-10-CM

## 2018-08-28 DIAGNOSIS — E11.9 DIABETES MELLITUS TYPE 2, DIET-CONTROLLED (HCC): ICD-10-CM

## 2018-08-28 DIAGNOSIS — Z12.5 PROSTATE CANCER SCREENING: ICD-10-CM

## 2018-08-28 DIAGNOSIS — E78.00 PURE HYPERCHOLESTEROLEMIA: ICD-10-CM

## 2018-08-28 DIAGNOSIS — Z71.89 ADVANCED CARE PLANNING/COUNSELING DISCUSSION: ICD-10-CM

## 2018-08-28 DIAGNOSIS — Z00.00 MEDICARE ANNUAL WELLNESS VISIT, SUBSEQUENT: Primary | ICD-10-CM

## 2018-08-28 PROCEDURE — 80061 LIPID PANEL: CPT

## 2018-08-28 PROCEDURE — 82043 UR ALBUMIN QUANTITATIVE: CPT

## 2018-08-28 PROCEDURE — 83036 HEMOGLOBIN GLYCOSYLATED A1C: CPT

## 2018-08-28 PROCEDURE — 84153 ASSAY OF PSA TOTAL: CPT

## 2018-08-28 RX ORDER — CHLORPHENIRAMINE MALEATE 4 MG
TABLET ORAL
COMMUNITY
Start: 2017-03-09 | End: 2020-10-23

## 2018-08-28 RX ORDER — FLAXSEED OIL 1000 MG
1000 CAPSULE ORAL
COMMUNITY
End: 2018-08-28 | Stop reason: SDUPTHER

## 2018-08-28 RX ORDER — CALCIUM CARBONATE 300MG(750)
0.5 TABLET,CHEWABLE ORAL
COMMUNITY

## 2018-08-28 RX ORDER — NAPROXEN 500 MG/1
500 TABLET ORAL
COMMUNITY
Start: 2016-10-19 | End: 2018-08-28 | Stop reason: SDUPTHER

## 2018-08-28 NOTE — PATIENT INSTRUCTIONS

## 2018-08-28 NOTE — MR AVS SNAPSHOT
[id number] [address] [place] [address] [facility] 
[phone] Patient: Moody Moses MRN: [id number] :1950 Visit Information Date & Time Provider Department Dept. Phone Encounter #  
 2018 10:15 AM Lyndsey Pendleton MD [facility] [address] [facility] [id number] Follow-up Instructions Return in about 6 months (around 2019). Upcoming Health Maintenance Date Due  
 FOOT EXAM Q1 1960 EYE EXAM RETINAL OR DILATED Q1 1958 ZOSTER VACCINE AGE 60> 2008 GLAUCOMA SCREENING Q2Y 2013 HEMOGLOBIN A1C Q6M 9/10/2017 Pneumococcal 65+ Low/Medium Risk (2 of 2 - PPSV23) 2017 MICROALBUMIN Q1 3/10/2018 LIPID PANEL Q1 3/10/2018 MEDICARE YEARLY EXAM 3/14/2018 Influenza Age 5 to Adult 2018 COLONOSCOPY 2021 DTaP/Tdap/Td series (2 - Td) 2024 Allergies as of 2018  Review Complete On: 2018 By: Lyndsey Pendleton MD  
  
 Severity Noted Reaction Type Reactions Atorvastatin  2015    Myalgia  
 forgetfullness Simvastatin  2015    Other (comments)  
 forgetfullness Sulfa (Sulfonamide Antibiotics)  2011    Unknown (comments) Patient is unaware Current Immunizations  Reviewed on 2018 Name Date Influenza High Dose Vaccine PF 2017, 11/15/2016 Pneumococcal Vaccine (Unspecified Type) 2012 Tdap 2014 Reviewed by Lyndsey Pendleton MD on 2018 at 10:43 AM  
 Reviewed by Lyndsey ePndleton MD on 2018 at 10:43 AM  
You Were Diagnosed With   
  
 Codes Comments Medicare annual wellness visit, subsequent    -  Primary ICD-10-CM: Z00.00 ICD-9-CM: V70.0 Prostate cancer screening     ICD-10-CM: Z12.5 ICD-9-CM: V76.44 Diabetes mellitus type 2, diet-controlled [facility]     ICD-10-CM: E11.9 ICD-9-CM: 250.00 Depression screen     ICD-10-CM: Z13.89 ICD-9-CM: V79.0 Advanced care planning/counseling discussion     ICD-10-CM: Z71.89 ICD-9-CM: V65.49 Pure hypercholesterolemia     ICD-10-CM: E78.00 ICD-9-CM: 272.0 Vitals BP Pulse Temp Resp Height(growth percentile) Weight(growth percentile) 132/80 (!) 49 98.3 °F (36.8 °C) (Oral) 18 5' 8\" (1.727 m) 187 lb 2 oz (84.9 kg) SpO2 BMI Smoking Status 98% 28.45 kg/m2 Former Smoker Vitals History BMI and BSA Data Body Mass Index Body Surface Area  
 28.45 kg/m 2 2.02 m 2 Preferred Pharmacy Pharmacy Name Phone [facility], [address] [place], [place] [phone] Your Updated Medication List  
  
   
This list is accurate as of 8/28/18 10:53 AM.  Always use your most recent med list.  
  
  
  
  
 aspirin delayed-release 81 mg tablet Take 1 Tab by mouth daily. Blood-Glucose Meter monitoring kit Commonly known as:  FREESTYLE FREEDOM LITE For blood sugar testing twice daily. Dx E11.9 Cholecalciferol (Vitamin D3) 1,000 unit Chew Take 0.5 Tabs by mouth. clotrimazole 1 % topical cream  
Commonly known as:  Elenore Awkward Apply  to affected area. flaxseed oil 1,000 mg Cap Take 1,000 mg by mouth two (2) times a day. glucose blood VI test strips strip Commonly known as:  FREESTYLE LITE STRIPS Test fasting blood sugar twice daily. Dx E11.9 Lancets [facility] Commonly known as:  FREESTYLE LANCETS For blood sugar testing twice daily. Dx E11.9  
  
 mupirocin calcium 2 % topical cream  
Commonly known as:  Brightfish Apply  to affected area two (2) times a day. We Performed the Following HEMOGLOBIN A1C WITH EAG [54657 CPT(R)] LIPID PANEL [48677 CPT(R)] MICROALBUMIN, UR, RAND W/ MICROALB/CREAT RATIO [id number] CPT(R)] PSA, DIAGNOSTIC (PROSTATE SPECIFIC AG) [id number] CPT(R)] Follow-up Instructions Return in about 6 months (around 2/28/2019). Patient Instructions Well Visit, Over 72: Care Instructions Your Care Instructions Physical exams can help you stay healthy. Your doctor has checked your overall health and may have suggested ways to take good care of yourself. He or she also may have recommended tests. At home, you can help prevent illness with healthy eating, regular exercise, and other steps. Follow-up care is a key part of your treatment and safety. Be sure to make and go to all appointments, and call your doctor if you are having problems. It's also a good idea to know your test results and keep a list of the medicines you take. How can you care for yourself at home? · Reach and stay at a healthy weight. This will lower your risk for many problems, such as obesity, diabetes, heart disease, and high blood pressure. · Get at least 30 minutes of exercise on most days of the week. Walking is a good choice. You also may want to do other activities, such as running, swimming, cycling, or playing tennis or team sports. · Do not smoke. Smoking can make health problems worse. If you need help quitting, talk to your doctor about stop-smoking programs and medicines. These can increase your chances of quitting for good. · Protect your skin from too much sun. When you're outdoors from 10 a.m. to 4 p.m., stay in the shade or cover up with clothing and a hat with a wide brim. Wear sunglasses that block UV rays. Even when it's cloudy, put broad-spectrum sunscreen (SPF 30 or higher) on any exposed skin. · See a dentist one or two times a year for checkups and to have your teeth cleaned. · Wear a seat belt in the car. · Limit alcohol to 2 drinks a day for men and 1 drink a day for women. Too much alcohol can cause health problems. Follow your doctor's advice about when to have certain tests. These tests can spot problems early. For men and women · Cholesterol.  Your doctor will tell you how often to have this done based on your overall health and other things that can increase your risk for heart attack and stroke. · Blood pressure. Have your blood pressure checked during a routine doctor visit. Your doctor will tell you how often to check your blood pressure based on your age, your blood pressure results, and other factors. · Diabetes. Ask your doctor whether you should have tests for diabetes. · Vision. Experts recommend that you have yearly exams for glaucoma and other age-related eye problems. · Hearing. Tell your doctor if you notice any change in your hearing. You can have tests to find out how well you hear. · Colon cancer tests. Keep having colon cancer tests as your doctor recommends. You can have one of several types of tests. · Heart attack and stroke risk. At least every 4 to 6 years, you should have your risk for heart attack and stroke assessed. Your doctor uses factors such as your age, blood pressure, cholesterol, and whether you smoke or have diabetes to show what your risk for a heart attack or stroke is over the next 10 years. · Osteoporosis. Talk to your doctor about whether you should have a bone density test to find out whether you have thinning bones. Also ask your doctor about whether you should take calcium and vitamin D supplements. For women · Pap test and pelvic exam. You may no longer need a Pap test. Talk with your doctor about whether to stop or continue to have Pap tests. · Breast exam and mammogram. Ask how often you should have a mammogram, which is an X-ray of your breasts. A mammogram can spot breast cancer before it can be felt and when it is easiest to treat. · Thyroid disease. Talk to your doctor about whether to have your thyroid checked as part of a regular physical exam. Women have an increased chance of a thyroid problem. For men · Prostate exam. Talk to your doctor about whether you should have a blood test (called a PSA test) for prostate cancer.  Experts disagree on whether men should have this test. Some experts recommend that you discuss the benefits and risks of the test with your doctor. · Abdominal aortic aneurysm. Ask your doctor whether you should have a test to check for an aneurysm. You may need a test if you ever smoked or if your parent, brother, sister, or child has had an aneurysm. When should you call for help? Watch closely for changes in your health, and be sure to contact your doctor if you have any problems or symptoms that concern you. Where can you learn more? Go to http://french-hailey.info/. Enter R778 in the search box to learn more about \"Well Visit, Over 65: Care Instructions. \" Current as of: May 16, 2017 Content Version: 11.7 © 5612-8320 [place], [employer]. Care instructions adapted under license by [employer] (which disclaims liability or warranty for this information). If you have questions about a medical condition or this instruction, always ask your healthcare professional. [place] [address] any warranty or liability for your use of this information. Introducing [facility]! Dear Jacobo Davenport: Thank you for requesting a [employer] account. Our records indicate that you already have an active [employer] account. You can access your account anytime at [email or web address]. [email or web address]/[email or web address] Did you know that you can access your hospital and ER discharge instructions at any time in Z[employer]? You can also review all of your test results from your hospital stay or ER visit. Additional Information If you have questions, please visit the Frequently Asked Questions section of the [employer] website at [email or web address] Remember, Z[employer] is NOT to be used for urgent needs. For medical emergencies, dial 911. Now available from your iPhone and Android! Please provide this summary of care documentation to your next provider. Your primary care clinician is listed as Lanette Soto. If you have any questions after today's visit, please call [phone].

## 2018-08-28 NOTE — PROGRESS NOTES
HISTORY OF PRESENT ILLNESS Shantel Macdonald is a 69 y.o. male. HPI This is a Subsequent Medicare Annual Wellness Visit providing Personalized Prevention Plan Services (PPPS) (Performed 12 months after initial AWV and PPPS ) Patient Active Problem List  
Diagnosis Code  CAD (coronary artery disease) I25.10  Hyperlipidemia E78.5  Advanced care planning/counseling discussion Z71.89  
 Diabetes mellitus type 2, diet-controlled ([facility] E11.9  Pure hypercholesterolemia E78.00 Past Surgical History:  
Procedure Laterality Date  CARDIAC SURG PROCEDURE UNLIST  11/1996  
 stent placed x2  HX COLONOSCOPY  07/2011  
 normal  
 HX HEENT    
 wisdom teeth extraction  HX HERNIA REPAIR    
 inguinal hernia  HX HERNIA REPAIR    
 bilateral inguinal  
 HX ORTHOPAEDIC    
 2nd L toe joint removal  
 HX ORTHOPAEDIC Right 12/2017  
 right thumb Social History Social History  Marital status:  Spouse name: N/A  
 Number of children: N/A  
 Years of education: N/A Occupational History  Not on file. Social History Main Topics  Smoking status: Former Smoker Packs/day: 0.25 Years: 35.00 Types: Cigarettes Quit date: 12/19/2012  Smokeless tobacco: Never Used  Alcohol use Yes Comment: 1/month  Drug use: No  
 Sexual activity: Not Currently Other Topics Concern  Not on file Social History Narrative ** Merged History Encounter ** Family History Problem Relation Age of Onset  Hypertension Mother  Cancer Father   
  prostate  Diabetes Father  Heart Disease Father  Hypertension Father  Alzheimer Father  Heart Disease Sister  Thyroid Disease Sister  Cancer Brother   
  prostate cancerr  Hypertension Brother  No Known Problems Daughter  No Known Problems Son   
 
Current Outpatient Prescriptions Medication Sig  
  clotrimazole (LOTRIMIN) 1 % topical cream Apply  to affected area.  Cholecalciferol, Vitamin D3, 1,000 unit chew Take 0.5 Tabs by mouth.  mupirocin calcium (BACTROBAN) 2 % topical cream Apply  to affected area two (2) times a day.  flaxseed oil 1,000 mg cap Take 1,000 mg by mouth two (2) times a day.  glucose blood VI test strips (FREESTYLE LITE STRIPS) strip Test fasting blood sugar twice daily. Dx E11.9  Blood-Glucose Meter (FREESTYLE FREEDOM LITE) monitoring kit For blood sugar testing twice daily. Dx E11.9  Lancets (FREESTYLE LANCETS) misc For blood sugar testing twice daily. Dx E11.9  
 aspirin delayed-release 81 mg tablet Take 1 Tab by mouth daily. No current facility-administered medications for this visit. Allergies Allergen Reactions  Atorvastatin Myalgia  
  forgetfullness  Simvastatin Other (comments)  
  forgetfullness  Sulfa (Sulfonamide Antibiotics) Unknown (comments) Patient is unaware Immunization History Administered Date(s) Administered  Influenza High Dose Vaccine PF 11/15/2016, 12/04/2017  Pneumococcal Vaccine (Unspecified Type) 12/19/2012  Tdap 01/01/2014 Depression scale assessment: PHQ over the last two weeks 8/28/2018 PHQ Not Done - Little interest or pleasure in doing things Not at all Feeling down, depressed, irritable, or hopeless Not at all Total Score PHQ 2 0 Alcohol screening assessment You do not drink alcohol or very rarely. Functional assessment/ safety Hearing Loss Hearing is good. Activities of Daily Living Self-care. Requires assistance with: no ADLs Fall Risk Fall Risk Assessment, last 12 mths 8/28/2018 Able to walk? Yes Fall in past 12 months? No  
 
 
Abuse Risk: 
Patient is not abused Advanced Medical Directive screening: 
Koko Soliman does not have an AMD on file in this chart.   ( If not, He will provide for us to copy to chart or he has been counseled on the need for this to assist with decision making should they be incapacitated due to illness and the steps necessary to draw up this document.) Health maintenance hx includes: 
Exercise: moderately active. Form of exercise: yard\work Diet: generally follows a low fat low cholesterol diet, generally follows a low sodium diet, exercises sporadically, nonsmoker Cancer screening:  
 Colon cancer screening:  Last Colonoscopy: 2011 and was normal 
 
 Men only: PSA testing 2017 and was within normal limits Hyperlipidemia: 
Aurelia Jacobson is following up on his dyslipidemia. Cardiovascular risks for him are: LDL goal is under 130. Currently he takes  , nothing Lab Results Component Value Date/Time Cholesterol, total 227 (H) 03/10/2017 08:18 AM  
 Cholesterol, total 203 (H) 01/08/2016 09:07 AM  
 Cholesterol, total 200 (H) 09/05/2014 08:14 AM  
 Cholesterol, total 213 (H) 12/20/2013 09:32 AM  
 HDL Cholesterol 38 (L) 03/10/2017 08:18 AM  
 HDL Cholesterol 37 (L) 01/08/2016 09:07 AM  
 HDL Cholesterol 38 (L) 09/05/2014 08:14 AM  
 HDL Cholesterol 34 (L) 12/20/2013 09:32 AM  
 LDL, calculated 142 (H) 03/10/2017 08:18 AM  
 LDL, calculated 132 (H) 01/08/2016 09:07 AM  
 LDL, calculated 132 (H) 09/05/2014 08:14 AM  
 LDL, calculated 131 (H) 12/20/2013 09:32 AM  
 Triglyceride 235 (H) 03/10/2017 08:18 AM  
 Triglyceride 170 (H) 01/08/2016 09:07 AM  
 Triglyceride 149 09/05/2014 08:14 AM  
 Triglyceride 241 (H) 12/20/2013 09:32 AM  
 
Lab Results Component Value Date/Time ALT (SGPT) 19 09/05/2014 08:14 AM  
 AST (SGOT) 18 09/05/2014 08:14 AM  
 Alk. phosphatase 76 09/05/2014 08:14 AM  
 Bilirubin, total 0.4 09/05/2014 08:14 AM  
 
 
Prediabetes: 
Aurelia Jacobson is here for follow up of elevated fasting sugars and prediabetes.   he has been counseled before on low carb/ low concentrated sweets diet and is following that plan. further diabetic ROS: no polyuria or polydipsia, no chest pain, dyspnea or TIAs . Lab Results Component Value Date/Time Glucose 157 (H) 12/04/2017 12:00 AM  
 Glucose (POC) 129 (H) 02/08/2017 07:02 AM  
 
Lab Results Component Value Date/Time Hemoglobin A1c 6.9 (H) 03/10/2017 08:18 AM  
 Hemoglobin A1c (POC) 6.3 11/15/2016 08:10 AM  
 
Last Point of Care HGB A1C Hemoglobin A1c (POC) Date Value Ref Range Status 11/15/2016 6.3 % Final  
  
 
 
 
 
 
Review of Systems Constitutional: Negative for malaise/fatigue and weight loss. Eyes: Negative for blurred vision and pain. Respiratory: Negative for cough, shortness of breath and wheezing. Cardiovascular: Negative for chest pain, palpitations and leg swelling. Gastrointestinal: Negative for abdominal pain, blood in stool, constipation, diarrhea, heartburn, nausea and vomiting. Genitourinary: Negative. Musculoskeletal: Negative for back pain, falls, joint pain and myalgias. Skin: Negative for rash. Neurological: Negative for dizziness and headaches. Endo/Heme/Allergies: Negative for environmental allergies. Does not bruise/bleed easily. Psychiatric/Behavioral: Negative for depression. The patient is not nervous/anxious and does not have insomnia. Physical Exam  
Constitutional: He appears well-developed and well-nourished. No distress. /80  Pulse (!) 49  Temp 98.3 °F (36.8 °C) (Oral)   Resp 18  Ht 5' 8\" (1.727 m)  Wt 187 lb 2 oz (84.9 kg)  SpO2 98%  BMI 28.45 kg/m2Body mass index is 28.45 kg/(m^2). HENT:  
Mouth/Throat: Oropharynx is clear and moist.  
Neck: No JVD present. Carotid bruit is not present. Cardiovascular: Regular rhythm, normal heart sounds and intact distal pulses. Bradycardia present. Pulmonary/Chest: Effort normal and breath sounds normal.  
Abdominal: Soft. Bowel sounds are normal. He exhibits no distension. There is no tenderness. Musculoskeletal: He exhibits no edema. Neurological: He is alert. Skin: Skin is warm and dry. He is not diaphoretic. Psychiatric: He has a normal mood and affect. His behavior is normal. Judgment and thought content normal.  
Nursing note and vitals reviewed. ASSESSMENT and PLAN Diagnoses and all orders for this visit: 
 
1. Medicare annual wellness visit, subsequent Rubi Louise was counseled on age-appropriate/ guideline-based risk prevention behaviors and screening for a 69y.o. year old   male . We also discussed adjustments in screening based on family history if necessary. Printed instructions for preventative screening guidelines and healthy behaviors given to patient with after visit summary. 2. Prostate cancer screening 
-     PROSTATE SPECIFIC AG 
 
3. Diabetes mellitus type 2, diet-controlled ([address] - on no medication currently. Check A1c. Consider metformin if A1c still elevated above 6.5.   
-     MICROALBUMIN, UR, RAND W/ MICROALB/CREAT RATIO 
-     HEMOGLOBIN A1C WITH EAG 4. Depression screen 5. Advanced care planning/counseling discussion I discussed importance of advanced care plans with Rubi Louise. he does not have an updated advanced care plan documented in this chart (and if not in chart, he will provide updated advanced medical directive or develop and provide AMD to be scanned). AMD literature provided to patient if needed. 6. Pure hypercholesterolemia -     LIPID PANEL Follow-up Disposition: 
Return in about 6 months (around 2/28/2019).

## 2018-08-29 LAB
ALBUMIN/CREAT UR: 6.9 MG/G CREAT (ref 0–30)
CHOLEST SERPL-MCNC: 190 MG/DL (ref 100–199)
CREAT UR-MCNC: 130.8 MG/DL
EST. AVERAGE GLUCOSE BLD GHB EST-MCNC: 148 MG/DL
HBA1C MFR BLD: 6.8 % (ref 4.8–5.6)
HDLC SERPL-MCNC: 36 MG/DL
INTERPRETATION, 910389: NORMAL
LDLC SERPL CALC-MCNC: 126 MG/DL (ref 0–99)
Lab: NORMAL
MICROALBUMIN UR-MCNC: 9 UG/ML
PSA SERPL-MCNC: 1.1 NG/ML (ref 0–4)
TRIGL SERPL-MCNC: 142 MG/DL (ref 0–149)
VLDLC SERPL CALC-MCNC: 28 MG/DL (ref 5–40)

## 2018-12-05 ENCOUNTER — HOSPITAL ENCOUNTER (OUTPATIENT)
Dept: MRI IMAGING | Age: 68
Discharge: HOME | End: 2018-12-05
Attending: ORTHOPAEDIC SURGERY
Payer: MEDICARE

## 2018-12-05 DIAGNOSIS — M23.204 DERANGEMENT OF MEDIAL MENISCUS OF LEFT KNEE DUE TO OLD INJURY: ICD-10-CM

## 2018-12-05 DIAGNOSIS — M17.12 PRIMARY LOCALIZED OSTEOARTHROSIS OF LOWER LEG, LEFT: ICD-10-CM

## 2018-12-05 DIAGNOSIS — M25.562 LEFT KNEE PAIN: ICD-10-CM

## 2018-12-05 PROCEDURE — 73721 MRI JNT OF LWR EXTRE W/O DYE: CPT

## 2019-10-17 ENCOUNTER — OFFICE VISIT (OUTPATIENT)
Dept: FAMILY MEDICINE CLINIC | Age: 69
End: 2019-10-17

## 2019-10-17 VITALS
WEIGHT: 190 LBS | HEIGHT: 68 IN | DIASTOLIC BLOOD PRESSURE: 84 MMHG | HEART RATE: 80 BPM | BODY MASS INDEX: 28.79 KG/M2 | OXYGEN SATURATION: 99 % | SYSTOLIC BLOOD PRESSURE: 133 MMHG | RESPIRATION RATE: 18 BRPM | TEMPERATURE: 97.9 F

## 2019-10-17 DIAGNOSIS — Z13.6 SCREENING FOR CARDIOVASCULAR CONDITION: ICD-10-CM

## 2019-10-17 DIAGNOSIS — E78.00 PURE HYPERCHOLESTEROLEMIA: ICD-10-CM

## 2019-10-17 DIAGNOSIS — Z13.39 SCREENING FOR ALCOHOLISM: ICD-10-CM

## 2019-10-17 DIAGNOSIS — Z00.00 MEDICARE ANNUAL WELLNESS VISIT, SUBSEQUENT: Primary | ICD-10-CM

## 2019-10-17 DIAGNOSIS — Z12.5 SPECIAL SCREENING FOR MALIGNANT NEOPLASM OF PROSTATE: ICD-10-CM

## 2019-10-17 DIAGNOSIS — Z23 ENCOUNTER FOR IMMUNIZATION: ICD-10-CM

## 2019-10-17 DIAGNOSIS — E11.9 DIABETES MELLITUS TYPE 2, DIET-CONTROLLED (HCC): ICD-10-CM

## 2019-10-17 DIAGNOSIS — Z87.891 PERSONAL HISTORY OF TOBACCO USE, PRESENTING HAZARDS TO HEALTH: ICD-10-CM

## 2019-10-17 DIAGNOSIS — F17.211 NICOTINE DEPENDENCE, CIGARETTES, IN REMISSION: ICD-10-CM

## 2019-10-17 RX ORDER — EPINEPHRINE 0.3 MG/.3ML
INJECTION SUBCUTANEOUS
COMMUNITY
Start: 2019-09-24

## 2019-10-17 RX ORDER — TICAGRELOR 90 MG/1
TABLET ORAL
COMMUNITY
Start: 2019-09-12

## 2019-10-17 NOTE — PATIENT INSTRUCTIONS
Medicare Wellness Visit, Male The best way to live healthy is to have a lifestyle where you eat a well-balanced diet, exercise regularly, limit alcohol use, and quit all forms of tobacco/nicotine, if applicable. Regular preventive services are another way to keep healthy. Preventive services (vaccines, screening tests, monitoring & exams) can help personalize your care plan, which helps you manage your own care. Screening tests can find health problems at the earliest stages, when they are easiest to treat. [facility] follows the current, evidence-based guidelines published by the [facility] ([facility]) when recommending preventive services for our patients. Because we follow these guidelines, sometimes recommendations change over time as research supports it. (For example, a prostate screening blood test is no longer routinely recommended for men with no symptoms.) Of course, you and your doctor may decide to screen more often for some diseases, based on your risk and co-morbidities (chronic disease you are already diagnosed with). Preventive services for you include: - Medicare offers their members a free annual wellness visit, which is time for you and your primary care provider to discuss and plan for your preventive service needs. Take advantage of this benefit every year! 
-All adults over age 72 should receive the recommended pneumonia vaccines. Current USPSTF guidelines recommend a series of two vaccines for the best pneumonia protection.  
-All adults should have a flu vaccine yearly and an ECG.  All adults age 61 and older should receive a shingles vaccine once in their lifetime.   
-All adults age 38-68 who are overweight should have a diabetes screening test once every three years.  
-Other screening tests & preventive services for persons with diabetes include: an eye exam to screen for diabetic retinopathy, a kidney function test, a foot exam, and stricter control over your cholesterol.  
-Cardiovascular screening for adults with routine risk involves an electrocardiogram (ECG) at intervals determined by the provider.  
-Colorectal cancer screening should be done for adults age 54-65 with no increased risk factors for colorectal cancer. There are a number of acceptable methods of screening for this type of cancer. Each test has its own benefits and drawbacks. Discuss with your provider what is most appropriate for you during your annual wellness visit. The different tests include: colonoscopy (considered the best screening method), a fecal occult blood test, a fecal DNA test, and sigmoidoscopy. 
-All adults born between [facility] should be screened once for Hepatitis C. 
-An Abdominal Aortic Aneurysm (AAA) Screening is recommended for men age 73-68 who has ever smoked in their lifetime. Here is a list of your current Health Maintenance items (your personalized list of preventive services) with a due date: 
Health Maintenance Due Topic Date Due  
 Diabetic Foot Care  11/12/1960 Ashley Evie Eye Exam  11/12/1960  Shingles Vaccine (1 of 2) 11/12/1998  Glaucoma Screening   11/12/2013  AAA Screening  11/12/2013  Pneumococcal Vaccine (2 of 2 - PPSV23) 12/19/2017  Hemoglobin A1C    02/28/2019  Flu Vaccine  08/01/2019  Albumin Urine Test  08/28/2019  Cholesterol Test   08/28/2019 Ashley Case Annual Well Visit  08/29/2019 Diabetes: 
Blood sugar goals: 
Hemoglobin A1c under 7 Fasting blood sugar  Blood sugar 2 hours after a meal under 180, 4 hours after a meal under 120 No hypoglycemia (sugars under 70 and symptomatic low sugars) Blood sugar control with diet and exercise: 
Exercise 45 minutes per day. This makes your insulin work better. It also allows insulin levels to fall helping with weight loss.  
Every night, try to fast from your evening meal to breakfast (at least 12 hours) without eating anything. This uses stored energy in your liver and makes insulin work better. Avoid simples sugars such as table sugar in drinks (sodas, lemonade, sweet tea, wine), desserts, candy. Also avoid fruit juices and high fructose corn syrup. Avoid frequent consumption of fruit especially grapes and bananas. A single serving of fruit daily is all you should have. Finally, drink less milk which has milk sugar in it. Control your starch intake. Men should have 3-4 carb portions per meal.  Women should have 2-3 carb portions per meal.  A carb serving is the equivalent of a slice of bread. Control your blood pressure and cholesterol. These problems are common in diabetes. AND, don't smoke. All of these problems contribute to heart disease and stroke risk. Get a yearly eye exam and flu shot. Make sure your vaccines are up to date particularly the pneumonia vaccines. Inspect your feet daily. Wear comfortable protective shoes and clean socks. Seek medical care if there are sore, calluses or numbness and burning of your feet. See your doctor at least every 6 months if you are on oral medicines or more often if the diabetic control is not at goal or if you are on insulin. Take your medicines religiously. STOP the SUGAR The first step in dietary efforts at weight loss is removing as much sugar from the diet as possible. Most dietary sugar is in the forms of table sugar (sucrose), fruit sugar (fructose) and milk sugar (lactose). But, as the picture above demonstrates, you need to watch labels to see if processed foods have added sugars under other names. To eliminate sucrose, eliminate sweet drinks entirely including soft drinks, sports drinks, lemonade, sweet tea and wine. Also, eliminate candy and make desserts a \"special occasion only treat\". Don't eat desserts with every meal or every night. Most fructose is found in fruit and this should be markedly limited. Fruit juice should be avoided. One piece of fruit daily is the limit. Berries are a good choice to eat as a garnish for other foods. Bananas and grapes should be avoided. Avoid high fructose corn syrup (an artificial sweetener). Milk sugar, or lactose, should be avoided as well. Milk is a good source of calcium but not for people struggling with weight issues. Put a little milk in your coffee or tea but otherwise avoid milk. How can you avoid sugar? For starters, don't buy it and don't bring it in the house. It won't tempt you that way! For more information: 
 
Try the internet for videos about sugar addiction or try diet doctor.com. Carb Counting in Diabetes Carbohydrate or carb counting is a method of calculating grams of carbohydrate consumed at meals and snacks. Foods that contain carbohydrate have the greatest effect on blood sugar compared to foods that contain protein or fat. A low carb diet has the greatest likelihood of promoting weight loss. What Foods Have Carbohydrate? Foods that contain carbohydrate or carbs are: 
 
-grains like wheat, rice, oatmeal, and barley 
-grain-based foods like bread, cereal, pasta, and crackers 
-starchy vegetables like potatoes, peas and corn 
-fruit and juice 
-milk and yogurt 
-dried beans and peas and soy products like veggie burgers 
-sweets and snack foods like sodas, juice drinks, cake, cookies, candy, and chips Non-starchy vegetables like lettuce, cucumbers, broccoli, and cauliflower have very little carbohydrate and minimal impact on your blood glucose. Carbohydrate Servings 
 
-In meal planning, 1 serving of a food with carbohydrate has about 15 grams of carbohydrate: 
-Check serving sizes with measuring cups and spoons or a food scale. -Read the Nutrition Facts on food labels to find out how many grams of carbohydrate are in foods you eat. -1 carb serving (15 grams) is roughly equal to one piece of bread How much carbohydrate should I eat? Diabetics are advised to eat: For women-30-45 grams or 2-3 carb servings per meal. 
For men-45-60 grams or 3-4 carb servings per meal. 
 
For weight loss, patients should try to eat under 100 grams of carbs per day. How do I \"manage\" carbs? 
 
-First, eliminate sugar in the form of soft drinks, candy and desserts. Minimize cakes, cookies, smoothies and juices. 
-Next, identify the carbs you are eating. Watch labels and know when you are eating a starch. Eat less bread, noodles, pasta, rice, potatoes, french fries, cereals, chips, crackers and yogurt 
-Eat more healthy proteins and fats with more eggs, full fat dairy products, non starchy vegetables, salads, meat, poultry, fish, cheese, berries, nuts, olive oil and butter. 
-Avoid beer. Europeans refer to beer as \"liquid bread\" and it is made from grains with a high carb content. Where can I find more information? There is a lot of information about carb counting on the internet and there are books about carb counting. Try the American Diabetes Association and Dietdoctor. com

## 2019-10-17 NOTE — PROGRESS NOTES
This is the Subsequent Medicare Annual Wellness Exam, performed 12 months or more after the Initial AWV or the last Subsequent AWV    I have reviewed the patient's medical history in detail and updated the computerized patient record. History     Past Medical History:   Diagnosis Date    CAD (coronary artery disease)     hypercholesterolemia    Hypercholesterolemia     difficulty tolerating statins    Hypertension     Myocardial infarct ([facility] [address] 08/25/2019    Stents in 1996 and 2019, Dr. Mp Farmer       Past Surgical History:   Procedure Laterality Date    CARDIAC SURG PROCEDURE UNLIST  11/1996    stent placed x2, 3 more 8/2019, Dr. Gladys Allison HX COLONOSCOPY  07/2011    normal    HX HEENT      wisdom teeth extraction    HX HERNIA REPAIR      inguinal hernia    HX HERNIA REPAIR      bilateral inguinal    HX ORTHOPAEDIC      2nd L toe joint removal    HX ORTHOPAEDIC Right 12/2017    right thumb      Current Outpatient Medications   Medication Sig Dispense Refill    varicella-zoster recombinant, PF, (SHINGRIX, PF,) 50 mcg/0.5 mL susr injection 0.5mL by IntraMUSCular route once now and then repeat in 2-6 months 0.5 mL 1    pneumococcal 23-barry ps vaccine (PNEUMOVAX 23) 25 mcg/0.5 mL injection 0.5 mL by IntraMUSCular route once for 1 dose. 0.5 mL 0    pneumococcal 13 barry conj dip (PREVNAR 13, PF,) 0.5 mL syrg injection 0.5 mL by IntraMUSCular route once for 1 dose. 0.5 mL 0    Cholecalciferol, Vitamin D3, 1,000 unit chew Take 0.5 Tabs by mouth.  clotrimazole (LOTRIMIN) 1 % topical cream Apply  to affected area.  mupirocin calcium (BACTROBAN) 2 % topical cream Apply  to affected area two (2) times a day. 15 g 0    flaxseed oil 1,000 mg cap Take 1,000 mg by mouth two (2) times a day.  glucose blood VI test strips (FREESTYLE LITE STRIPS) strip Test fasting blood sugar twice daily.  Dx E11.9 180 Strip 11    Blood-Glucose Meter (FREESTYLE FREEDOM LITE) monitoring kit For blood sugar testing twice daily. Dx E11.9 1 Kit 0    Lancets (FREESTYLE LANCETS) [facility] For blood sugar testing twice daily. Dx E11.9 200 Each 11    aspirin delayed-release 81 mg tablet Take 1 Tab by mouth daily.  BRILINTA 90 mg tablet       EPINEPHrine (EPIPEN) 0.3 mg/0.3 mL injection        Allergies   Allergen Reactions    Atorvastatin Myalgia     forgetfullness  Other reaction(s): Myalgia  forgetfullness    Simvastatin Other (comments)     forgetfullness    Other reaction(s):  Other (comments)  forgetfullness    Sulfa (Sulfonamide Antibiotics) Unknown (comments)     Patient is unaware    Unable To Obtain Unable to Obtain     Other reaction(s): Unknown (comments)  Other reaction(s): Unknown (comments)  Patient is unaware     Family History   Problem Relation Age of Onset    Hypertension Mother     Cancer Father         prostate    Diabetes Father     Heart Disease Father     Hypertension Father     Alzheimer Father     Heart Disease Sister     Thyroid Disease Sister     Cancer Brother         prostate cancerr    Hypertension Brother     No Known Problems Daughter     No Known Problems Son      Social History     Tobacco Use    Smoking status: Former Smoker     Packs/day: 0.25     Years: 35.00     Pack years: 8.75     Types: Cigarettes     Last attempt to quit: 2012     Years since quittin.8    Smokeless tobacco: Never Used   Substance Use Topics    Alcohol use: Yes     Comment: 1/month     Patient Active Problem List   Diagnosis Code    CAD (coronary artery disease) I25.10    Hyperlipidemia E78.5    Advanced care planning/counseling discussion Z71.89    Diabetes mellitus type 2, diet-controlled ([facility] [address] E11.9    Pure hypercholesterolemia E78.00       Depression Risk Factor Screening:     3 most recent PHQ Screens 10/17/2019   PHQ Not Done -   Little interest or pleasure in doing things Not at all   Feeling down, depressed, irritable, or hopeless Not at all   Total Score PHQ 2 0     Alcohol Risk Factor Screening: You do not drink alcohol or very rarely. Functional Ability and Level of Safety:   Hearing Loss  Hearing is good. Activities of Daily Living  The home contains: grab bars  Patient does total self care    Fall Risk  Fall Risk Assessment, last 12 mths 10/17/2019   Able to walk? Yes   Fall in past 12 months? No       Abuse Screen  Patient is not abused    Cognitive Screening   Evaluation of Cognitive Function:  Has your family/caregiver stated any concerns about your memory: no  Normal    Patient Care Team   Patient Care Team:  Berenice Arana MD as PCP - General (Internal Medicine)  [facility] Amy Patel RN as Ambulatory Care Navigator    Assessment/Plan   Education and counseling provided:  Are appropriate based on today's review and evaluation  End-of-Life planning (with patient's consent)  Pneumococcal Vaccine  Influenza Vaccine  Prostate cancer screening tests (PSA, covered annually)  Colorectal cancer screening tests  Bone mass measurement (DEXA)  Screening for glaucoma      Recent MI and 3 stents    DM2  Diet only    Hypercholesterolemia  Intolerant of previous statins  Given rx for crestor after stents by Dr. Ajit Huntley  Encouraged to try Crestor if no success, livalo and repatha recommended in that order. ROS:  General/Constitutional:  No headache, fever, fatigue, weight loss or weight gain     Eyes:  No redness, pruritis, pain, visual changes, swelling, or discharge    Ears:  No pain, loss or changes in hearin    Neck:  No swelling, masses, stiffness, pain, or limited movemen    Cardiac:   No chest pain, racing heartbeat or palpitations  Respiratory:  No cough or shortness of breath    GI:  No nausea/vomiting, diarrhea, abdominal pain, bloody or dark stools     :  No dysuria or  hematuria   Musculoskeletal:  No joint pain, muscle pain, back pain, neck pain. No joint swelling or redness.   Neurological:  No loss of consciousness, dizziness, seizures, dysarthria, cognitive changes, memory changes,  problems with balance, or unilateral weakness    Skin: No rash     Physical Examination: General appearance - alert, well appearing, and in no distress, oriented to person, place, and time and normal appearing weight  Mental status -  normal mood, behavior, speech, dress, motor activity, and thought processes, no expressed suicidal or homicidal ideation, no hallucinations  Ears - bilateral TM's and external ear canals normal  Nose - normal and patent, no erythema, discharge or polyps  Mouth - mucous membranes moist, pharynx normal without lesions  Neck - supple, no significant adenopathy  Chest - normal chest excursion, normal inspiratory and expiratory function. Clear to ausculation bilaterally. Heart - normal rate, regular rhythm, normal S1, S2, no murmurs, rubs, clicks or gallops, no extremity edema  Abdomen- benign, no organomegaly or masses  -normal penis and testicles, 2+ prostate  Skin-no rashes or suspicious moles  Heme negative stool  Neuro normal speech, moves all extremities, normal gait  Musculoskeletal- grossly normal joint and motor function. Diagnoses and all orders for this visit:    1. Medicare annual wellness visit, subsequent    2. Diabetes mellitus type 2, diet-controlled (HCC)  -     METABOLIC PANEL, BASIC  -     MICROALBUMIN, UR, RAND W/ MICROALB/CREAT RATIO  -     CBC WITH AUTOMATED DIFF  -     HEMOGLOBIN A1C WITH EAG  -      DIABETES FOOT EXAM    3. Pure hypercholesterolemia  -     LIPID PANEL  -     HEPATIC FUNCTION PANEL    4. Screening for alcoholism  -     MA ANNUAL ALCOHOL SCREEN 15 MIN    5.  Encounter for immunization  -     ADMIN INFLUENZA VIRUS VAC  -     INFLUENZA VIRUS VACCINE, HIGH DOSE SEASONAL, PRESERVATIVE FREE  -     varicella-zoster recombinant, PF, (SHINGRIX, PF,) 50 mcg/0.5 mL susr injection; 0.5mL by IntraMUSCular route once now and then repeat in 2-6 months  -     pneumococcal 13 barry conj dip (PREVNAR 13, PF,) 0.5 mL syrg injection; 0.5 mL by IntraMUSCular route once for 1 dose. 6. Screening for cardiovascular condition  -     US EXAM SCREENING AAA; Future    7. Special screening for malignant neoplasm of prostate  -     NH PROSTATE CA SCREENING; LEAH  -     PSA SCREENING (SCREENING)    8. Personal history of tobacco use, presenting hazards to health  -     CT LOW DOSE LUNG CANCER SCREENING; Future    9. Nicotine dependence, cigarettes, in remission    Other orders  -     pneumococcal 23-barry ps vaccine (PNEUMOVAX 23) 25 mcg/0.5 mL injection; 0.5 mL by IntraMUSCular route once for 1 dose. Health Maintenance Due   Topic Date Due    FOOT EXAM Q1  11/12/1960    EYE EXAM RETINAL OR DILATED  11[birth year]12/1958    Shingrix Vaccine Age 50> (1 of 2) 11/12/1998    GLAUCOMA SCREENING Q2Y  11/12/2013    AAA Screening 73-67 YO Male Smoking Patients  11/12/2013    Pneumococcal 65+ years (2 of 2 - PPSV23) 12/19/2017    HEMOGLOBIN A1C Q6M  02/28/2019    Influenza Age 5 to Adult  08/01/2019    MICROALBUMIN Q1  08/28/2019    LIPID PANEL Q1  08/28/2019    MEDICARE YEARLY EXAM  08/29/2019       Discussed with patient current guidelines for screening for lung cancer. Current recommendations are to obtain yearly screening LDCT yearly for age 46-80, or until smoke free for 15 years. Patient has 45 pack year history of cigarette smoking and currently quit a month ago. Discussed with patient risks and benefits of screening, including over-diagnosis, false positive rate, and total radiation exposure. Patient currently exhibits no signs or symptoms suggestive of lung cancer. Discussed with patient importance of compliance with yearly annual lung cancer screenings and willingness to undergo diagnosis and treatment if screening scan is positive. In addition, patient was counseled regarding (remaining smoke free/total smoking cessation).

## 2019-10-19 LAB
ALBUMIN SERPL-MCNC: 4 G/DL (ref 3.5–4.8)
ALBUMIN/CREAT UR: 9.4 MG/G CREAT (ref 0–30)
ALP SERPL-CCNC: 82 IU/L (ref 39–117)
ALT SERPL-CCNC: 21 IU/L (ref 0–44)
AST SERPL-CCNC: 19 IU/L (ref 0–40)
BASOPHILS # BLD AUTO: 0 X10E3/UL (ref 0–0.2)
BASOPHILS NFR BLD AUTO: 1 %
BILIRUB DIRECT SERPL-MCNC: 0.1 MG/DL (ref 0–0.4)
BILIRUB SERPL-MCNC: 0.4 MG/DL (ref 0–1.2)
BUN SERPL-MCNC: 12 MG/DL (ref 8–27)
BUN/CREAT SERPL: 15 (ref 10–24)
CALCIUM SERPL-MCNC: 8.9 MG/DL (ref 8.6–10.2)
CHLORIDE SERPL-SCNC: 102 MMOL/L (ref 96–106)
CHOLEST SERPL-MCNC: 185 MG/DL (ref 100–199)
CO2 SERPL-SCNC: 23 MMOL/L (ref 20–29)
CREAT SERPL-MCNC: 0.82 MG/DL (ref 0.76–1.27)
CREAT UR-MCNC: 85.8 MG/DL
EOSINOPHIL # BLD AUTO: 0.3 X10E3/UL (ref 0–0.4)
EOSINOPHIL NFR BLD AUTO: 4 %
ERYTHROCYTE [DISTWIDTH] IN BLOOD BY AUTOMATED COUNT: 12.7 % (ref 12.3–15.4)
EST. AVERAGE GLUCOSE BLD GHB EST-MCNC: 151 MG/DL
GLUCOSE SERPL-MCNC: 129 MG/DL (ref 65–99)
HBA1C MFR BLD: 6.9 % (ref 4.8–5.6)
HCT VFR BLD AUTO: 44.2 % (ref 37.5–51)
HDLC SERPL-MCNC: 35 MG/DL
HGB BLD-MCNC: 14.9 G/DL (ref 13–17.7)
IMM GRANULOCYTES # BLD AUTO: 0 X10E3/UL (ref 0–0.1)
IMM GRANULOCYTES NFR BLD AUTO: 1 %
LDLC SERPL CALC-MCNC: 115 MG/DL (ref 0–99)
LYMPHOCYTES # BLD AUTO: 1.6 X10E3/UL (ref 0.7–3.1)
LYMPHOCYTES NFR BLD AUTO: 23 %
MCH RBC QN AUTO: 32 PG (ref 26.6–33)
MCHC RBC AUTO-ENTMCNC: 33.7 G/DL (ref 31.5–35.7)
MCV RBC AUTO: 95 FL (ref 79–97)
MICROALBUMIN UR-MCNC: 8.1 UG/ML
MONOCYTES # BLD AUTO: 0.6 X10E3/UL (ref 0.1–0.9)
MONOCYTES NFR BLD AUTO: 9 %
NEUTROPHILS # BLD AUTO: 4.7 X10E3/UL (ref 1.4–7)
NEUTROPHILS NFR BLD AUTO: 62 %
PLATELET # BLD AUTO: 235 X10E3/UL (ref 150–450)
POTASSIUM SERPL-SCNC: 4.4 MMOL/L (ref 3.5–5.2)
PROT SERPL-MCNC: 6.9 G/DL (ref 6–8.5)
PSA SERPL-MCNC: 0.8 NG/ML (ref 0–4)
RBC # BLD AUTO: 4.65 X10E6/UL (ref 4.14–5.8)
SODIUM SERPL-SCNC: 140 MMOL/L (ref 134–144)
TRIGL SERPL-MCNC: 175 MG/DL (ref 0–149)
VLDLC SERPL CALC-MCNC: 35 MG/DL (ref 5–40)
WBC # BLD AUTO: 7.3 X10E3/UL (ref 3.4–10.8)

## 2019-10-21 NOTE — PROGRESS NOTES
Your blood work is available for review. The diabetic control is good. I think we outlined a good way to work on the cholesterol. Let me know if the Crestor is intolerable. See me in 6 months otherwise and as needed.

## 2019-10-29 ENCOUNTER — HOSPITAL ENCOUNTER (OUTPATIENT)
Dept: ULTRASOUND IMAGING | Age: 69
Discharge: HOME | End: 2019-10-29
Attending: FAMILY MEDICINE
Payer: MEDICARE

## 2019-10-29 ENCOUNTER — HOSPITAL ENCOUNTER (OUTPATIENT)
Dept: CT IMAGING | Age: 69
Discharge: HOME | End: 2019-10-29
Attending: FAMILY MEDICINE
Payer: MEDICARE

## 2019-10-29 DIAGNOSIS — Z87.891 PERSONAL HISTORY OF TOBACCO USE, PRESENTING HAZARDS TO HEALTH: ICD-10-CM

## 2019-10-29 DIAGNOSIS — Z13.6 SCREENING FOR CARDIOVASCULAR CONDITION: ICD-10-CM

## 2019-10-29 PROCEDURE — 76706 US ABDL AORTA SCREEN AAA: CPT

## 2019-10-29 PROCEDURE — G0297 LDCT FOR LUNG CA SCREEN: HCPCS

## 2019-10-29 NOTE — PROGRESS NOTES
The ultrasound, while it did not get a perfect view of your aorta, shows no sign of an aneurysm.   The measurements on the aorta size are normal.

## 2019-10-30 NOTE — PROGRESS NOTES
As you can see, the CT shows no sign of cancer. It does show the heart disease so it is important to work on the heart risk and smoking as we discussed. See me if you have questions.

## 2020-02-28 ENCOUNTER — TELEPHONE (OUTPATIENT)
Dept: FAMILY MEDICINE CLINIC | Age: 70
End: 2020-02-28

## 2020-02-28 NOTE — TELEPHONE ENCOUNTER
Pt is on BRILINTA  and would like to take the Flu shot and pt would like to know if he should take it because he is on the blood thinner.

## 2020-10-23 ENCOUNTER — OFFICE VISIT (OUTPATIENT)
Dept: FAMILY MEDICINE CLINIC | Age: 70
End: 2020-10-23
Payer: MEDICARE

## 2020-10-23 VITALS
WEIGHT: 181 LBS | HEART RATE: 78 BPM | RESPIRATION RATE: 16 BRPM | OXYGEN SATURATION: 100 % | DIASTOLIC BLOOD PRESSURE: 65 MMHG | TEMPERATURE: 98.1 F | HEIGHT: 68 IN | BODY MASS INDEX: 27.43 KG/M2 | SYSTOLIC BLOOD PRESSURE: 130 MMHG

## 2020-10-23 DIAGNOSIS — E04.1 THYROID NODULE: ICD-10-CM

## 2020-10-23 DIAGNOSIS — Z23 NEEDS FLU SHOT: ICD-10-CM

## 2020-10-23 DIAGNOSIS — Z23 ENCOUNTER FOR IMMUNIZATION: ICD-10-CM

## 2020-10-23 DIAGNOSIS — Z12.5 SCREENING FOR MALIGNANT NEOPLASM OF PROSTATE: ICD-10-CM

## 2020-10-23 DIAGNOSIS — Z00.00 MEDICARE ANNUAL WELLNESS VISIT, SUBSEQUENT: Primary | ICD-10-CM

## 2020-10-23 DIAGNOSIS — E11.9 CONTROLLED TYPE 2 DIABETES MELLITUS WITHOUT COMPLICATION, WITHOUT LONG-TERM CURRENT USE OF INSULIN (HCC): ICD-10-CM

## 2020-10-23 DIAGNOSIS — I25.10 CORONARY ARTERY DISEASE INVOLVING NATIVE CORONARY ARTERY OF NATIVE HEART WITHOUT ANGINA PECTORIS: Chronic | ICD-10-CM

## 2020-10-23 PROCEDURE — G8419 CALC BMI OUT NRM PARAM NOF/U: HCPCS | Performed by: FAMILY MEDICINE

## 2020-10-23 PROCEDURE — G8536 NO DOC ELDER MAL SCRN: HCPCS | Performed by: FAMILY MEDICINE

## 2020-10-23 PROCEDURE — 99213 OFFICE O/P EST LOW 20 MIN: CPT | Performed by: FAMILY MEDICINE

## 2020-10-23 PROCEDURE — 3046F HEMOGLOBIN A1C LEVEL >9.0%: CPT | Performed by: FAMILY MEDICINE

## 2020-10-23 PROCEDURE — G0008 ADMIN INFLUENZA VIRUS VAC: HCPCS

## 2020-10-23 PROCEDURE — 2022F DILAT RTA XM EVC RTNOPTHY: CPT | Performed by: FAMILY MEDICINE

## 2020-10-23 PROCEDURE — G8427 DOCREV CUR MEDS BY ELIG CLIN: HCPCS | Performed by: FAMILY MEDICINE

## 2020-10-23 PROCEDURE — 90653 IIV ADJUVANT VACCINE IM: CPT

## 2020-10-23 PROCEDURE — G0439 PPPS, SUBSEQ VISIT: HCPCS | Performed by: FAMILY MEDICINE

## 2020-10-23 PROCEDURE — 3017F COLORECTAL CA SCREEN DOC REV: CPT | Performed by: FAMILY MEDICINE

## 2020-10-23 PROCEDURE — G8510 SCR DEP NEG, NO PLAN REQD: HCPCS | Performed by: FAMILY MEDICINE

## 2020-10-23 PROCEDURE — 1101F PT FALLS ASSESS-DOCD LE1/YR: CPT | Performed by: FAMILY MEDICINE

## 2020-10-23 RX ORDER — PNEUMOCOCCAL 13-VALENT CONJUGATE VACCINE 2.2; 2.2; 2.2; 2.2; 2.2; 4.4; 2.2; 2.2; 2.2; 2.2; 2.2; 2.2; 2.2 UG/.5ML; UG/.5ML; UG/.5ML; UG/.5ML; UG/.5ML; UG/.5ML; UG/.5ML; UG/.5ML; UG/.5ML; UG/.5ML; UG/.5ML; UG/.5ML; UG/.5ML
0.5 INJECTION, SUSPENSION INTRAMUSCULAR ONCE
Qty: 0.5 ML | Refills: 0 | Status: SHIPPED | OUTPATIENT
Start: 2020-10-23 | End: 2020-10-23

## 2020-10-23 RX ORDER — ZOSTER VACCINE RECOMBINANT, ADJUVANTED 50 MCG/0.5
KIT INTRAMUSCULAR
Qty: 0.5 ML | Refills: 1 | Status: SHIPPED | OUTPATIENT
Start: 2020-10-23

## 2020-10-23 NOTE — PROGRESS NOTES
This is the Subsequent Medicare Annual Wellness Exam, performed 12 months or more after the Initial AWV or the last Subsequent AWV    I have reviewed the patient's medical history in detail and updated the computerized patient record. History     Patient Active Problem List   Diagnosis Code    CAD (coronary artery disease) I25.10    Hyperlipidemia E78.5    Advanced care planning/counseling discussion Z71.89    Diabetes mellitus type 2, diet-controlled ([facility] [address] E11.9    Pure hypercholesterolemia E78.00     Past Medical History:   Diagnosis Date    CAD (coronary artery disease)     hypercholesterolemia    Hypercholesterolemia     difficulty tolerating statins    Hypertension     Myocardial infarct ([facility] [address] 08/25/2019    Stents in 1996 and 2019, Dr. Hafsa Haider       Past Surgical History:   Procedure Laterality Date    CARDIAC SURG PROCEDURE UNLIST  11/1996    stent placed x2, 3 more 8/2019, Dr. Delisa Lyon HX COLONOSCOPY  07/2011    normal, Dr. Ta Marcial, repeat 2011    HX HEENT      wisdom teeth extraction    HX HERNIA REPAIR      inguinal hernia    HX HERNIA REPAIR      bilateral inguinal    HX ORTHOPAEDIC      2nd L toe joint removal    HX ORTHOPAEDIC Right 12/2017    right thumb      Current Outpatient Medications   Medication Sig Dispense Refill    varicella-zoster recombinant, PF, (Shingrix, PF,) 50 mcg/0.5 mL susr injection 0.5mL by IntraMUSCular route once now and then repeat in 2-6 months 0.5 mL 1    pneumococcal 13 barry conj dip (Prevnar 13, PF,) 0.5 mL syrg injection 0.5 mL by IntraMUSCular route once for 1 dose. 0.5 mL 0    pneumococcal 23-barry ps vaccine (PNEUMOVAX 23) 25 mcg/0.5 mL injection 0.5 mL by IntraMUSCular route once for 1 dose. 0.5 mL 0    BRILINTA 90 mg tablet       EPINEPHrine (EPIPEN) 0.3 mg/0.3 mL injection       Cholecalciferol, Vitamin D3, 1,000 unit chew Take 0.5 Tabs by mouth.  flaxseed oil 1,000 mg cap Take 1,000 mg by mouth two (2) times a day.       glucose blood VI test strips (FREESTYLE LITE STRIPS) strip Test fasting blood sugar twice daily. Dx E11.9 180 Strip 11    Blood-Glucose Meter (FREESTYLE FREEDOM LITE) monitoring kit For blood sugar testing twice daily. Dx E11.9 1 Kit 0    Lancets (FREESTYLE LANCETS) misc For blood sugar testing twice daily. Dx E11.9 200 Each 11    aspirin delayed-release 81 mg tablet Take 1 Tab by mouth daily. Allergies   Allergen Reactions    Atorvastatin Myalgia     forgetfullness  Other reaction(s): Myalgia  forgetfullness    Simvastatin Other (comments)     forgetfullness    Other reaction(s):  Other (comments)  forgetfullness    Sulfa (Sulfonamide Antibiotics) Unknown (comments)     Patient is unaware    Unable To Obtain Unable to Obtain     Other reaction(s): Unknown (comments)  Other reaction(s): Unknown (comments)  Patient is unaware       Family History   Problem Relation Age of Onset    Hypertension Mother     Cancer Father         prostate    Diabetes Father     Heart Disease Father     Hypertension Father     Alzheimer Father     Heart Disease Sister     Thyroid Disease Sister     Cancer Brother         prostate cancerr    Hypertension Brother     No Known Problems Daughter     No Known Problems Son      Social History     Tobacco Use    Smoking status: Former Smoker     Packs/day: 0.25     Years: 35.00     Pack years: 8.75     Types: Cigarettes     Last attempt to quit: 2012     Years since quittin.8    Smokeless tobacco: Never Used   Substance Use Topics    Alcohol use: Yes     Comment: 1/month       Depression Risk Factor Screening:     3 most recent PHQ Screens 10/23/2020   PHQ Not Done -   Little interest or pleasure in doing things Not at all   Feeling down, depressed, irritable, or hopeless Not at all   Total Score PHQ 2 0       Alcohol Risk Screen   Do you average more than 1 drink per night or more than 7 drinks a week: No    In the past three months have you have had more than 4 drinks containing alcohol on one occasion: No    Quit smoking 8/2019    Functional Ability and Level of Safety:   Hearing: Hearing is good. Vision:  Dr. Cassidy Galicia Sees Dentist    Activities of Daily Living: The home contains: handrails and grab bars  Patient does total self care     Ambulation: with no difficulty     Fall Risk:  Fall Risk Assessment, last 12 mths 10/23/2020   Able to walk? Yes   Fall in past 12 months? No     Abuse Screen:  Patient is not abused       Cognitive Screening   Has your family/caregiver stated any concerns about your memory: no     Cognitive Screening: interview    Patient Care Team   Patient Care Team:  Yaz Scott MD as PCP - General (Family Medicine)  Yaz Scott MD as PCP - [facility] Empaneled Provider  Bshsi, Not On File    Assessment/Plan   Education and counseling provided:  Are appropriate based on today's review and evaluation  End-of-Life planning (with patient's consent)    Thyroid nodule found left lower pole. New. Former smoker    DM2. Last a1c 6.9  Quit smoking a year ago  Walking and has lost weight. ROS:  Negative for chest pain, dyspnea  No blood urine or stool    Physical Examination: General appearance - alert, well appearing, and in no distress, oriented to person, place, and time and normal appearing weight  Mental status -  normal mood, behavior, speech, dress, motor activity, and thought processes, no expressed suicidal or homicidal ideation, no hallucinations  Ears - bilateral TM's and external ear canals normal  Nose - normal and patent, no erythema, discharge or polyps  Mouth - mucous membranes moist, pharynx normal without lesions  Neck - supple, no significant adenopathy left lower pole thyroid nodule 1.5 CM  Chest - normal chest excursion, normal inspiratory and expiratory function. Clear to ausculation bilaterally.     Heart - normal rate, regular rhythm, normal S1, S2, no murmurs, rubs, clicks or gallops, no extremity edema  Abdomen- benign, no organomegaly or masses  -normal penis and testicles, 2+ prostate  Skin-no rashes or suspicious moles  Heme negative stool  Neuro normal speech, moves all extremities, normal gait  Musculoskeletal- grossly normal joint and motor function.     Diabetic Foot Exam:  Protective sensation is intact bilaterally. Pedal pulses are 2+ and normal bilaterally. L foot skin inspection:  normal skin and soft tissue with no gross edema or evidence of acute injury or foot ulcer  R foot skin inspection:  skin and soft tissue appear normal with no significant edema or evidence of acute injury or foot ulcer   Diagnoses and all orders for this visit:    1. Medicare annual wellness visit, subsequent    2. Thyroid nodule  -     TSH 3RD GENERATION; Future  -      THYROID/PARATHYROID/SOFT TISS; Future    3. Controlled type 2 diabetes mellitus without complication, without long-term current use of insulin (HCC)  -     CBC WITH AUTOMATED DIFF; Future  -     HEMOGLOBIN A1C WITH EAG; Future  -     METABOLIC PANEL, BASIC; Future  -     MICROALBUMIN, UR, RAND W/ MICROALB/CREAT RATIO; Future  -      DIABETES FOOT EXAM    4. Screening for malignant neoplasm of prostate  -     PSA, DIAGNOSTIC (PROSTATE SPECIFIC AG); Future    5. Encounter for immunization  -     varicella-zoster recombinant, PF, (Shingrix, PF,) 50 mcg/0.5 mL susr injection; 0.5mL by IntraMUSCular route once now and then repeat in 2-6 months  -     pneumococcal 13 barry conj dip (Prevnar 13, PF,) 0.5 mL syrg injection; 0.5 mL by IntraMUSCular route once for 1 dose. -     pneumococcal 23-barry ps vaccine (PNEUMOVAX 23) 25 mcg/0.5 mL injection; 0.5 mL by IntraMUSCular route once for 1 dose. 6. Needs flu shot  -     INFLUENZA, HIGH DOSE SEASONAL    7. Coronary artery disease involving native coronary artery of native heart without angina pectoris  -     HEPATIC FUNCTION PANEL; Future  -     LIPID PANEL;  Future        Health Maintenance Due   Topic Date Due    Eye Exam Retinal or Dilated  11/12/1960    Shingrix Vaccine Age 50> (1 of 2) 11/12/1998    GLAUCOMA SCREENING Q2Y  11/12/2013    Pneumococcal 65+ years (2 of 2 - PPSV23) 12/19/2017    Flu Vaccine (1) 09/01/2020    Foot Exam Q1  10/17/2020    A1C test (Diabetic or Prediabetic)  10/18/2020    MICROALBUMIN Q1  10/18/2020    Medicare Yearly Exam  10/17/2020    Lipid Screen  10/18/2020

## 2020-10-23 NOTE — PROGRESS NOTES
Identified pt with two pt identifiers(name and ). Reviewed record in preparation for visit and have obtained necessary documentation. Chief Complaint   Patient presents with    Complete Physical    Immunization/Injection        Health Maintenance Due   Topic    Eye Exam Retinal or Dilated     Shingrix Vaccine Age 50> (1 of 2)    GLAUCOMA SCREENING Q2Y     Pneumococcal 65+ years (2 of 2 - PPSV23)    Flu Vaccine (1)    Foot Exam Q1     A1C test (Diabetic or Prediabetic)     MICROALBUMIN Q1     Medicare Yearly Exam     Lipid Screen        Coordination of Care Questionnaire:  :   1) Have you been to an emergency room, urgent care, or hospitalized since your last visit? If yes, where when, and reason for visit?  no       2. Have seen or consulted any other health care provider since your last visit? If yes, where when, and reason for visit?    No

## 2020-10-23 NOTE — PATIENT INSTRUCTIONS
Medicare Wellness Visit, Male    The best way to live healthy is to have a lifestyle where you eat a well-balanced diet, exercise regularly, limit alcohol use, and quit all forms of tobacco/nicotine, if applicable. Regular preventive services are another way to keep healthy. Preventive services (vaccines, screening tests, monitoring & exams) can help personalize your care plan, which helps you manage your own care. Screening tests can find health problems at the earliest stages, when they are easiest to treat. [facility] follows the current, evidence-based guidelines published by the [facility] ([facility]) when recommending preventive services for our patients. Because we follow these guidelines, sometimes recommendations change over time as research supports it. (For example, a prostate screening blood test is no longer routinely recommended for men with no symptoms). Of course, you and your doctor may decide to screen more often for some diseases, based on your risk and co-morbidities (chronic disease you are already diagnosed with). Preventive services for you include:  - Medicare offers their members a free annual wellness visit, which is time for you and your primary care provider to discuss and plan for your preventive service needs. Take advantage of this benefit every year!  -All adults over age 72 should receive the recommended pneumonia vaccines. Current USPSTF guidelines recommend a series of two vaccines for the best pneumonia protection.   -All adults should have a flu vaccine yearly and tetanus vaccine every 10 years.  -All adults age 48 and older should receive the shingles vaccines (series of two vaccines).        -All adults age 38-68 who are overweight should have a diabetes screening test once every three years.   -Other screening tests & preventive services for persons with diabetes include: an eye exam to screen for diabetic retinopathy, a kidney function test, a foot exam, and stricter control over your cholesterol.   -Cardiovascular screening for adults with routine risk involves an electrocardiogram (ECG) at intervals determined by the provider.   -Colorectal cancer screening should be done for adults age 54-65 with no increased risk factors for colorectal cancer. There are a number of acceptable methods of screening for this type of cancer. Each test has its own benefits and drawbacks. Discuss with your provider what is most appropriate for you during your annual wellness visit. The different tests include: colonoscopy (considered the best screening method), a fecal occult blood test, a fecal DNA test, and sigmoidoscopy.  -All adults born between [facility] should be screened once for Hepatitis C.  -An Abdominal Aortic Aneurysm (AAA) Screening is recommended for men age 73-68 who has ever smoked in their lifetime. Here is a list of your current Health Maintenance items (your personalized list of preventive services) with a due date:  Health Maintenance Due   Topic Date Due    Eye Exam  11/12/1960    Shingles Vaccine (1 of 2) 11/12/1998    Glaucoma Screening   11/12/2013    Pneumococcal Vaccine (2 of 2 - PPSV23) 12/19/2017    Yearly Flu Vaccine (1) 09/01/2020    Diabetic Foot Care  10/17/2020    Hemoglobin A1C    10/18/2020    Albumin Urine Test  10/18/2020    Annual Well Visit  10/17/2020    Cholesterol Test   10/18/2020     Vaccine Information Statement    Influenza (Flu) Vaccine (Inactivated or Recombinant): What You Need to Know    Many Vaccine Information Statements are available in [place] and other languages. See www.immunize.org/vis  Hojas de información sobre vacunas están disponibles en español y en muchos otros idiomas. Visite www.immunize.org/vis    1. Why get vaccinated? Influenza vaccine can prevent influenza (flu).     Flu is a contagious disease that spreads around the [place] every year, usually between October and May. Anyone can get the flu, but it is more dangerous for some people. Infants and young children, people 72years of age and older, pregnant women, and people with certain health conditions or a weakened immune system are at greatest risk of flu complications. Pneumonia, bronchitis, sinus infections and ear infections are examples of flu-related complications. If you have a medical condition, such as heart disease, cancer or diabetes, flu can make it worse. Flu can cause fever and chills, sore throat, muscle aches, fatigue, cough, headache, and runny or stuffy nose. Some people may have vomiting and diarrhea, though this is more common in children than adults. Each year thousands of people in the [facility] die from flu, and many more are hospitalized. Flu vaccine prevents millions of illnesses and flu-related visits to the doctor each year. 2. Influenza vaccines     CDC recommends everyone 10months of age and older get vaccinated every flu season. Children 6 months through 6years of age may need 2 doses during a single flu season. Everyone else needs only 1 dose each flu season. It takes about 2 weeks for protection to develop after vaccination. There are many flu viruses, and they are always changing. Each year a new flu vaccine is made to protect against three or four viruses that are likely to cause disease in the upcoming flu season. Even when the vaccine doesnt exactly match these viruses, it may still provide some protection. Influenza vaccine does not cause flu. Influenza vaccine may be given at the same time as other vaccines. 3. Talk with your health care provider    Tell your vaccine provider if the person getting the vaccine:   Has had an allergic reaction after a previous dose of influenza vaccine, or has any severe, life-threatening allergies.  Has ever had Guillain-Barré Syndrome (also called GBS).     In some cases, your health care provider may decide to postpone influenza vaccination to a future visit. People with minor illnesses, such as a cold, may be vaccinated. People who are moderately or severely ill should usually wait until they recover before getting influenza vaccine. Your health care provider can give you more information. 4. Risks of a reaction     Soreness, redness, and swelling where shot is given, fever, muscle aches, and headache can happen after influenza vaccine.  There may be a very small increased risk of Guillain-Barré Syndrome (GBS) after inactivated influenza vaccine (the flu shot). [employer] children who get the flu shot along with pneumococcal vaccine (PCV13), and/or DTaP vaccine at the same time might be slightly more likely to have a seizure caused by fever. Tell your health care provider if a child who is getting flu vaccine has ever had a seizure. People sometimes faint after medical procedures, including vaccination. Tell your provider if you feel dizzy or have vision changes or ringing in the ears. As with any medicine, there is a very remote chance of a vaccine causing a severe allergic reaction, other serious injury, or death. 5. What if there is a serious problem? An allergic reaction could occur after the vaccinated person leaves the clinic. If you see signs of a severe allergic reaction (hives, swelling of the face and throat, difficulty breathing, a fast heartbeat, dizziness, or weakness), call 9-1-1 and get the person to the nearest hospital.    For other signs that concern you, call your health care provider. Adverse reactions should be reported to the Vaccine Adverse Event Reporting System (VAERS). Your health care provider will usually file this report, or you can do it yourself. Visit the VAERS website at www.vaers. hhs.gov or call 6-377.253.7235. VAERS is only for reporting reactions, and VAERS staff do not give medical advice.     6. The National Vaccine Injury Compensation Program    The [employer] Injury Compensation Program (VICP) is a federal program that was created to compensate people who may have been injured by certain vaccines. Visit the VICP website at www.hrsa.gov/vaccinecompensation or call 9-275.626.6131 to learn about the program and about filing a claim. There is a time limit to file a claim for compensation. 7. How can I learn more?  Ask your health care provider.  Call your local or state health department.  Contact the Centers for Disease Control and Prevention (CDC):  - Call 1-575.413.4943 (9-335-QOH-INFO) or  - Visit CDCs influenza website at www.cdc.gov/flu    Vaccine Information Statement (Interim)  Inactivated Influenza Vaccine   8/15/2019  [address] FRANTZ Patel [id number]-[id number]   Department of Health and Human Services  Centers for Disease Control and Prevention    Office Use Only    The essentials of losing weight and a diabetic lower carbohydrate diet. 1. Exercise  a. You should exercise 45- 60 minutes every day. b. This reduces the stored energy in your muscles and allows your insulin level to fall.  c. You can only lose weight when your insulin level is low. Then you burn stored energy. 2.  Fasting   a. You should fast every night from 12-14 hours. b.  Neil Brown are still using energy at night when you are sleeping and will burn stored energy. c.  Fasting allows you burn stored energy in your internal organs such as the liver. This allows the insulin level to drop.   d.  This allows you to start losing weight. 3.  No sugar!   a. You should try to eliminate sugar from your diet. b.  First, eliminate sweet drinks including:  sodas and jami, sports drinks (like Gatorade or Poweraid), sweet tea and lemonade, wine (and beer), fruit juice (contains fruit sugar) and milk (contains milk sugar). c.  Eliminate sugary cereals, cookies and candies. No donuts, pastries or coffee cake.    d.  Eat desserts only on special occasions:  family reunions, birthdays, anniversaries, major holidays. Eat only small desserts!   e. Start watching labels for foods that have added starches. 4. Limit starches   a  Limit starches particularly:  bread, noodles, pasta, crackers and chips, rice, potatoes and fries. b.  Watch out for starchy vegetables:  corn and peas. c.  Women can have 30-45 grams of carbs per day   d. Men can have 45-60 grams of carbs per day   e. One piece of bread has 15-20 grams of carbs   f. One half cup of oatmeal, corn, rice, peas and cooked pasta have about 15 grams of carbs. 5.  Fruit-special case   a. Fruit has nutrients we need such as Vitamin C but also contains a lot of fruit sugar (fructose)   b. Fruit is not a good snack because fructose does not suppress your appetite. c.  Fruit can cause progression of fatty liver disease   d. Limit yourself to one serving of fruit per day and try to eat fruit that is dark red, blue or purple.   e.  One serving of fruit is 1/2 to 3/4 of a cup.    6.  What do I eat instead?   a. Eat protein. It curbs your appetite longer than carbs do anyway. Eat meat, chicken, fish and eggs. b.  Eat healthy fats:  fish, olive oil, nuts   c. Eat more vegetables and salads. d.  Eat all of the above before you eat any carbs.   e.  Eat slowly and enjoy your food. f.  Drink more water. 7.  Snacks   a. Good snacks:  Cheese, nuts, Kind bars (minis), Protein bars, carrot sticks, celery sticks. b.  Read labels and look for snacks with low amounts of carbohydrate per serving (10 or less)    8. Accountability   a. You have to keep yourself honest about your diet efforts. You need reminders to stick to your change in lifestyle and diet. b.  Weigh yourself daily   c.  Record your food intake either on an lee or in writing.   d.  Record your exercise. 9.  Support and emotional health. a.  Surround yourself with people that will help you and support your efforts. b.   Avoid people that may sabotage your efforts or insist that they at least not undermine you.  c.  Be patient. An average patient loses only 3 pounds a month but that 36 pounds at the end of a year. d.  Think long term. Try to keep up good exercise and diet habits. Once you get the weight off, keep it off.  e.  Pay attention to your emotions about food and your weight. Have a healthy attitude towards yourself and your body.

## 2020-10-30 ENCOUNTER — HOSPITAL ENCOUNTER (OUTPATIENT)
Dept: ULTRASOUND IMAGING | Age: 70
Discharge: HOME | End: 2020-10-30
Attending: FAMILY MEDICINE
Payer: MEDICARE

## 2020-10-30 DIAGNOSIS — E04.1 THYROID NODULE: ICD-10-CM

## 2020-10-30 PROCEDURE — 76536 US EXAM OF HEAD AND NECK: CPT

## 2020-11-02 NOTE — PROGRESS NOTES
Your recent thyroid ultrasound shows only a goiter or enlarged thyroid. There are small nodules in the goiter that do not have features that are worrisome for malignancy. Because the thyroid blood test is normal, too, no further testing or biopsy of the thyroid is needed. Good results!!! See me if you would like to discuss these results further or if you have questions. Otherwise, see me in 6 months and as needed.     [facility]

## 2020-11-24 ENCOUNTER — DOCUMENTATION ONLY (OUTPATIENT)
Dept: FAMILY MEDICINE CLINIC | Age: 70
End: 2020-11-24

## 2021-02-15 ENCOUNTER — OFFICE VISIT (OUTPATIENT)
Dept: FAMILY MEDICINE CLINIC | Age: 71
End: 2021-02-15
Payer: MEDICARE

## 2021-02-15 VITALS
HEIGHT: 68 IN | BODY MASS INDEX: 28.79 KG/M2 | SYSTOLIC BLOOD PRESSURE: 129 MMHG | HEART RATE: 63 BPM | TEMPERATURE: 98.1 F | RESPIRATION RATE: 16 BRPM | WEIGHT: 190 LBS | DIASTOLIC BLOOD PRESSURE: 72 MMHG | OXYGEN SATURATION: 100 %

## 2021-02-15 DIAGNOSIS — E11.9 CONTROLLED TYPE 2 DIABETES MELLITUS WITHOUT COMPLICATION, WITHOUT LONG-TERM CURRENT USE OF INSULIN (HCC): ICD-10-CM

## 2021-02-15 DIAGNOSIS — S70.12XA CONTUSION OF LEFT THIGH, INITIAL ENCOUNTER: Primary | ICD-10-CM

## 2021-02-15 PROCEDURE — G8427 DOCREV CUR MEDS BY ELIG CLIN: HCPCS | Performed by: FAMILY MEDICINE

## 2021-02-15 PROCEDURE — 1101F PT FALLS ASSESS-DOCD LE1/YR: CPT | Performed by: FAMILY MEDICINE

## 2021-02-15 PROCEDURE — G8432 DEP SCR NOT DOC, RNG: HCPCS | Performed by: FAMILY MEDICINE

## 2021-02-15 PROCEDURE — G8419 CALC BMI OUT NRM PARAM NOF/U: HCPCS | Performed by: FAMILY MEDICINE

## 2021-02-15 PROCEDURE — 3017F COLORECTAL CA SCREEN DOC REV: CPT | Performed by: FAMILY MEDICINE

## 2021-02-15 PROCEDURE — G8536 NO DOC ELDER MAL SCRN: HCPCS | Performed by: FAMILY MEDICINE

## 2021-02-15 PROCEDURE — 99213 OFFICE O/P EST LOW 20 MIN: CPT | Performed by: FAMILY MEDICINE

## 2021-02-15 NOTE — PROGRESS NOTES
Assessment and Plan    1. Contusion of left thigh, initial encounter  Tylenol for pain  Reassured that dissecting bruise is not dangerous      Follow-up and Dispositions    · Return if symptoms worsen or fail to improve. Diagnosis and plan discussed with patient who verbillized understanding. History of present illness:Roc MEGHANN Jayde Freeman is a 65 y.o. male presenting for Fall (2 weeks ( L thigh ))    Walking backwards and fell into arm of chair will left lateral thigh  Extensive bruising but only sore to the touch in thigh  Bruising all the way to feet  ASA and Brillenta  Hard to sleep on that side. Already has had COVID in clinical trials. Review of Systems   Constitutional: Negative for chills, fever and weight loss. HENT: Negative for congestion and sore throat. Respiratory: Negative for cough and shortness of breath. Cardiovascular: Negative for chest pain and leg swelling. Musculoskeletal: Positive for falls. Endo/Heme/Allergies: Bruises/bleeds easily. Psychiatric/Behavioral: Negative.           Past Medical History:   Diagnosis Date    CAD (coronary artery disease)     hypercholesterolemia    Hypercholesterolemia     difficulty tolerating statins    Hypertension     Myocardial infarct ([facility] [address] 08/25/2019    Stents in 1996 and 2019, Dr. Rosita Jones      Past Surgical History:   Procedure Laterality Date    HX COLONOSCOPY  07/2011    normal, Dr. Shirley Santos, repeat 2011    HX HEENT      wisdom teeth extraction    HX HERNIA REPAIR      inguinal hernia    HX HERNIA REPAIR      bilateral inguinal    HX ORTHOPAEDIC      2nd L toe joint removal    HX ORTHOPAEDIC Right 12/2017    right thumb     [place] CARDIAC SURG PROCEDURE UNLIST  11/1996    stent placed x2, 3 more 8/2019, Dr. [facility]     Family History   Problem Relation Age of Onset    Hypertension Mother     Cancer Father         prostate    Diabetes Father     Heart Disease Father     Hypertension Father     Alzheimer Father    [address] [facility] Heart Disease Sister     Thyroid Disease Sister     Cancer Brother         prostate cancerr    Hypertension Brother     No Known Problems Daughter     No Known Problems Son      Social History     Socioeconomic History    Marital status:      Spouse name: Not on file    Number of children: Not on file    Years of education: Not on file    Highest education level: Not on file   Occupational History    Not on file   Social Needs    Financial resource strain: Not on file    Food insecurity     Worry: Not on file     Inability: Not on file   [employer] needs     Medical: Not on file     Non-medical: Not on file   Tobacco Use    Smoking status: Former Smoker     Packs/day: 0.25     Years: 35.00     Pack years: 8.75     Types: Cigarettes     Quit date: 2012     Years since quittin.1    Smokeless tobacco: Never Used   Substance and Sexual Activity    Alcohol use: Yes     Comment: 1/month    Drug use: Never    Sexual activity: Yes   Lifestyle    Physical activity     Days per week: Not on file     Minutes per session: Not on file    Stress: Not on file   Relationships    Social connections     Talks on phone: Not on file     Gets together: Not on file     Attends Congregational service: Not on file     Active member of club or organization: Not on file     Attends meetings of clubs or organizations: Not on file     Relationship status: Not on file    Intimate partner violence     Fear of current or ex partner: Not on file     Emotionally abused: Not on file     Physically abused: Not on file     Forced sexual activity: Not on file   Other Topics Concern    Not on file   Social History Narrative    ** Merged History Encounter **              Prior to Admission medications    Medication Sig Start Date End Date Taking?  Authorizing Provider   varicella-zoster recombinant, PF, (Shingrix, PF,) 50 mcg/0.5 mL susr injection 0.5mL by IntraMUSCular route once now and then repeat in 2-6 months 10/23/20   Alva Coy MD   BRILINTA 90 mg tablet  9/12/19   Provider, Historical   EPINEPHrine (EPIPEN) 0.3 mg/0.3 mL injection  9/24/19   Provider, Historical   Cholecalciferol, Vitamin D3, 1,000 unit chew Take 0.5 Tabs by mouth. Provider, Historical   flaxseed oil 1,000 mg cap Take 1,000 mg by mouth two (2) times a day. Provider, Historical   glucose blood VI test strips (FREESTYLE LITE STRIPS) strip Test fasting blood sugar twice daily. Dx E11.9 3/29/16   Yakelin Cunningham MD   Blood-Glucose Meter (FREESTYLE FREEDOM LITE) monitoring kit For blood sugar testing twice daily. Dx E11.9 1/14/16   Cinthia Luong MD   Lancets (FREESTYLE LANCETS) misc For blood sugar testing twice daily. Dx E11.9 1/14/16   Cinthia Luong MD   aspirin delayed-release 81 mg tablet Take 1 Tab by mouth daily. Cinthia Luong MD        Allergies   Allergen Reactions    Atorvastatin Myalgia     forgetfullness  Other reaction(s): Myalgia  forgetfullness    Simvastatin Other (comments)     forgetfullness    Other reaction(s): Other (comments)  forgetfullness    Sulfa (Sulfonamide Antibiotics) Unknown (comments)     Patient is unaware    Unable To Obtain Unable to Obtain     Other reaction(s): Unknown (comments)  Other reaction(s): Unknown (comments)  Patient is unaware       Vitals:    02/15/21 1527 02/15/21 1531   BP: (!) 149/77 129/72   Pulse: 63    Resp: 16    Temp: 98.1 °F (36.7 °C)    TempSrc: Temporal    SpO2: 100%    Weight: 190 lb (86.2 kg)    Height: 5' 7.5\" (1.715 m)      Body mass index is 29.32 kg/m². Objective  Physical Exam  General: Patient alert and oriented and in NAD  Left lateral thigh tender to touch at site of injury, mild  No motor deficits of either lower extremity  Bruising dorsolateral foot   Psych: Appropriate mood and affect, no homicidal or suicidal ideation, no obsessions, delusions or hallucinations, normal psychomotor status.

## 2021-02-15 NOTE — PROGRESS NOTES
Identified pt with two pt identifiers(name and ). Reviewed record in preparation for visit and have obtained necessary documentation. Chief Complaint   Patient presents with    Fall     2 weeks ( L thigh )        Health Maintenance Due   Topic    Eye Exam Retinal or Dilated     COVID-19 Vaccine (1 of 2)    Shingrix Vaccine Age 50> (1 of 2)    GLAUCOMA SCREENING Q2Y     Pneumococcal 65+ years (2 of 2 - PPSV23)       Coordination of Care Questionnaire:  :   1) Have you been to an emergency room, urgent care, or hospitalized since your last visit? If yes, where when, and reason for visit? No       2. Have seen or consulted any other health care provider since your last visit? If yes, where when, and reason for visit?   No

## 2021-03-18 ENCOUNTER — TELEPHONE (OUTPATIENT)
Dept: FAMILY MEDICINE CLINIC | Age: 71
End: 2021-03-18

## 2021-03-18 DIAGNOSIS — E11.9 CONTROLLED TYPE 2 DIABETES MELLITUS WITHOUT COMPLICATION, WITHOUT LONG-TERM CURRENT USE OF INSULIN (HCC): Primary | ICD-10-CM

## 2021-03-18 NOTE — TELEPHONE ENCOUNTER
Please advise       ----- Message from Jimmie Moses sent at 3/16/2021  3:13 PM EDT -----  Regarding: Dr. David Piper first and last name: n/a      Reason for call: Needs A1C test done before dental surgery on April 4th.       Callback required yes/no and why: yes      Best contact number(s): [phone]      Details to clarify the request: n/a      Jimmie Moses

## 2021-03-22 LAB
ANION GAP SERPL CALC-SCNC: 4 MMOL/L (ref 5–15)
BASOPHILS # BLD: 0.1 K/UL (ref 0–0.1)
BASOPHILS NFR BLD: 1 % (ref 0–1)
BUN SERPL-MCNC: 14 MG/DL (ref 6–20)
BUN/CREAT SERPL: 19 (ref 12–20)
CALCIUM SERPL-MCNC: 9.2 MG/DL (ref 8.5–10.1)
CHLORIDE SERPL-SCNC: 107 MMOL/L (ref 97–108)
CO2 SERPL-SCNC: 27 MMOL/L (ref 21–32)
CREAT SERPL-MCNC: 0.74 MG/DL (ref 0.7–1.3)
DIFFERENTIAL METHOD BLD: NORMAL
EOSINOPHIL # BLD: 0.3 K/UL (ref 0–0.4)
EOSINOPHIL NFR BLD: 4 % (ref 0–7)
ERYTHROCYTE [DISTWIDTH] IN BLOOD BY AUTOMATED COUNT: 12.2 % (ref 11.5–14.5)
EST. AVERAGE GLUCOSE BLD GHB EST-MCNC: 143 MG/DL
GLUCOSE SERPL-MCNC: 151 MG/DL (ref 65–100)
HBA1C MFR BLD: 6.6 % (ref 4–5.6)
HCT VFR BLD AUTO: 44 % (ref 36.6–50.3)
HGB BLD-MCNC: 14.4 G/DL (ref 12.1–17)
IMM GRANULOCYTES # BLD AUTO: 0 K/UL (ref 0–0.04)
IMM GRANULOCYTES NFR BLD AUTO: 0 % (ref 0–0.5)
LYMPHOCYTES # BLD: 2 K/UL (ref 0.8–3.5)
LYMPHOCYTES NFR BLD: 27 % (ref 12–49)
MCH RBC QN AUTO: 32.1 PG (ref 26–34)
MCHC RBC AUTO-ENTMCNC: 32.7 G/DL (ref 30–36.5)
MCV RBC AUTO: 98.2 FL (ref 80–99)
MONOCYTES # BLD: 0.5 K/UL (ref 0–1)
MONOCYTES NFR BLD: 7 % (ref 5–13)
NEUTS SEG # BLD: 4.6 K/UL (ref 1.8–8)
NEUTS SEG NFR BLD: 61 % (ref 32–75)
NRBC # BLD: 0 K/UL (ref 0–0.01)
NRBC BLD-RTO: 0 PER 100 WBC
PLATELET # BLD AUTO: 216 K/UL (ref 150–400)
PMV BLD AUTO: 9.4 FL (ref 8.9–12.9)
POTASSIUM SERPL-SCNC: 4.6 MMOL/L (ref 3.5–5.1)
RBC # BLD AUTO: 4.48 M/UL (ref 4.1–5.7)
SODIUM SERPL-SCNC: 138 MMOL/L (ref 136–145)
WBC # BLD AUTO: 7.5 K/UL (ref 4.1–11.1)

## 2021-03-23 NOTE — PROGRESS NOTES
Your blood work looks fine for your proposed dental surgery. Take a copy of these labs to your dentist.  Keep working on the diet as we have discussed. See me in August about the meds and to recheck the blood sugar.   See me as needed otherwise  [facility]

## 2021-08-26 ENCOUNTER — TELEPHONE (OUTPATIENT)
Dept: FAMILY MEDICINE CLINIC | Age: 71
End: 2021-08-26

## 2021-08-26 NOTE — TELEPHONE ENCOUNTER
----- Message from [facility] sent at 8/26/2021  3:29 PM EDT -----  Regarding: Dr Kristan Seth Message/Vendor Calls    Caller's first and last name: Pt      Reason for call: Pt scraped his arm and it would not stop bleeding. It is still soaking through bandages. Pt tried to make an appt but none were available. What should pt do?       Callback required yes/no and why: Yes      Best contact number(s): [phone]      Details to clarify the request: [id number]

## 2021-08-26 NOTE — TELEPHONE ENCOUNTER
TC-  To Mr. [address] [facility]   Name and  verified      Spoke with pt he is taking ASA and Brilinta not able to get the scrape  to stop bleeding. This happened about 12 noon and still bleeding.  He was told to go to urgent care to be seen

## 2021-10-21 ENCOUNTER — CLINICAL SUPPORT (OUTPATIENT)
Dept: FAMILY MEDICINE CLINIC | Age: 71
End: 2021-10-21
Payer: MEDICARE

## 2021-10-21 VITALS
HEIGHT: 68 IN | SYSTOLIC BLOOD PRESSURE: 123 MMHG | DIASTOLIC BLOOD PRESSURE: 74 MMHG | OXYGEN SATURATION: 99 % | WEIGHT: 186.4 LBS | HEART RATE: 68 BPM | TEMPERATURE: 97.4 F | BODY MASS INDEX: 28.25 KG/M2 | RESPIRATION RATE: 16 BRPM

## 2021-10-21 DIAGNOSIS — Z23 ENCOUNTER FOR IMMUNIZATION: Primary | ICD-10-CM

## 2021-10-21 PROCEDURE — 90694 VACC AIIV4 NO PRSRV 0.5ML IM: CPT | Performed by: NURSE PRACTITIONER

## 2021-10-21 PROCEDURE — G0008 ADMIN INFLUENZA VIRUS VAC: HCPCS | Performed by: NURSE PRACTITIONER

## 2022-03-19 PROBLEM — E78.00 PURE HYPERCHOLESTEROLEMIA: Status: ACTIVE | Noted: 2017-03-09

## 2022-05-24 NOTE — PROGRESS NOTES
Group Topic: BH Therapeutic Activity    Date: 11/17/2021  Start Time: 1730  End Time: 1815  Facilitators: Sheila Dugan    Focus: Card making  Number in attendance: 5    Pt was invited to participate in expressive art group with several modalities to choose from. Benefits of expressive art may include expression of thoughts and emotions, enjoyment, distraction, and relaxation.    Pt was recruited for group but did not attend. Efforts to encourage participation in programming on the unit will continue.    Sheila Dugan M.Ed., [facility], RYT       \" This addendum has been created to correct a medical record clerical error, wherein an erroneous  was selected for your administered flu vaccine . \"

## 2022-11-28 ENCOUNTER — CLINICAL SUPPORT (OUTPATIENT)
Dept: FAMILY MEDICINE CLINIC | Age: 72
End: 2022-11-28

## 2022-11-28 ENCOUNTER — NURSE TRIAGE (OUTPATIENT)
Dept: OTHER | Facility: CLINIC | Age: 72
End: 2022-11-28

## 2022-11-28 ENCOUNTER — TELEPHONE (OUTPATIENT)
Dept: FAMILY MEDICINE CLINIC | Age: 72
End: 2022-11-28

## 2022-11-28 VITALS
DIASTOLIC BLOOD PRESSURE: 79 MMHG | SYSTOLIC BLOOD PRESSURE: 171 MMHG | HEART RATE: 60 BPM | RESPIRATION RATE: 16 BRPM | HEIGHT: 68 IN | WEIGHT: 186 LBS | BODY MASS INDEX: 28.19 KG/M2 | OXYGEN SATURATION: 99 %

## 2022-11-28 DIAGNOSIS — E11.9 CONTROLLED TYPE 2 DIABETES MELLITUS WITHOUT COMPLICATION, WITHOUT LONG-TERM CURRENT USE OF INSULIN (HCC): Primary | ICD-10-CM

## 2022-11-28 NOTE — TELEPHONE ENCOUNTER
Location of patient: [address] call from [facility] at [facility] with [employer]. Subjective: Caller states \"My BP was 166-180/80. \"     Current Symptoms: Last BP was 1 week ago at dentist office when it was high. 1 month ago it was high prior to cataract surgery. No headache, blurry vision, chest pain or SOB. Does not have a diagnosis of high blood pressure. BP at time of call is 157/105. BP on other arm is 172/85. Onset: 1 month ago; waxing and waning    Associated Symptoms: NA    Pain Severity: 0/10    Temperature: denies fever     LMP: NA Pregnant: NA    Recommended disposition: See in Office Today    Care advice provided, patient verbalizes understanding; denies any other questions or concerns; instructed to call back for any new or worsening symptoms. Patient/Caller agrees with recommended disposition; writer provided warm transfer to [employer] at [facility] for appointment scheduling    Attention Provider: Thank you for allowing me to participate in the care of your patient. The patient was connected to triage in response to information provided to the ECC. Please do not respond through this encounter as the response is not directed to a shared pool.     Reason for Disposition   Patient wants to be seen    Protocols used: Blood Pressure - High-ADULT-[place]

## 2022-11-29 ENCOUNTER — OFFICE VISIT (OUTPATIENT)
Dept: FAMILY MEDICINE CLINIC | Age: 72
End: 2022-11-29
Payer: MEDICARE

## 2022-11-29 ENCOUNTER — DOCUMENTATION ONLY (OUTPATIENT)
Dept: FAMILY MEDICINE CLINIC | Age: 72
End: 2022-11-29

## 2022-11-29 VITALS
WEIGHT: 183 LBS | BODY MASS INDEX: 27.74 KG/M2 | TEMPERATURE: 98.6 F | OXYGEN SATURATION: 100 % | DIASTOLIC BLOOD PRESSURE: 89 MMHG | HEART RATE: 78 BPM | HEIGHT: 68 IN | SYSTOLIC BLOOD PRESSURE: 145 MMHG

## 2022-11-29 DIAGNOSIS — N40.0 BENIGN PROSTATIC HYPERPLASIA, UNSPECIFIED WHETHER LOWER URINARY TRACT SYMPTOMS PRESENT: ICD-10-CM

## 2022-11-29 DIAGNOSIS — I25.10 CORONARY ARTERY DISEASE INVOLVING NATIVE CORONARY ARTERY OF NATIVE HEART WITHOUT ANGINA PECTORIS: ICD-10-CM

## 2022-11-29 DIAGNOSIS — Z12.11 SCREENING FOR COLON CANCER: ICD-10-CM

## 2022-11-29 DIAGNOSIS — I10 ESSENTIAL HYPERTENSION: ICD-10-CM

## 2022-11-29 DIAGNOSIS — Z00.00 MEDICARE ANNUAL WELLNESS VISIT, SUBSEQUENT: Primary | ICD-10-CM

## 2022-11-29 DIAGNOSIS — E11.9 CONTROLLED TYPE 2 DIABETES MELLITUS WITHOUT COMPLICATION, WITHOUT LONG-TERM CURRENT USE OF INSULIN (HCC): ICD-10-CM

## 2022-11-29 PROCEDURE — 1101F PT FALLS ASSESS-DOCD LE1/YR: CPT | Performed by: FAMILY MEDICINE

## 2022-11-29 PROCEDURE — G8417 CALC BMI ABV UP PARAM F/U: HCPCS | Performed by: FAMILY MEDICINE

## 2022-11-29 PROCEDURE — 3046F HEMOGLOBIN A1C LEVEL >9.0%: CPT | Performed by: FAMILY MEDICINE

## 2022-11-29 PROCEDURE — 3074F SYST BP LT 130 MM HG: CPT | Performed by: FAMILY MEDICINE

## 2022-11-29 PROCEDURE — 99214 OFFICE O/P EST MOD 30 MIN: CPT | Performed by: FAMILY MEDICINE

## 2022-11-29 PROCEDURE — 3017F COLORECTAL CA SCREEN DOC REV: CPT | Performed by: FAMILY MEDICINE

## 2022-11-29 PROCEDURE — 3078F DIAST BP <80 MM HG: CPT | Performed by: FAMILY MEDICINE

## 2022-11-29 PROCEDURE — G8510 SCR DEP NEG, NO PLAN REQD: HCPCS | Performed by: FAMILY MEDICINE

## 2022-11-29 PROCEDURE — 2022F DILAT RTA XM EVC RTNOPTHY: CPT | Performed by: FAMILY MEDICINE

## 2022-11-29 PROCEDURE — G8427 DOCREV CUR MEDS BY ELIG CLIN: HCPCS | Performed by: FAMILY MEDICINE

## 2022-11-29 PROCEDURE — G0439 PPPS, SUBSEQ VISIT: HCPCS | Performed by: FAMILY MEDICINE

## 2022-11-29 PROCEDURE — 1123F ACP DISCUSS/DSCN MKR DOCD: CPT | Performed by: FAMILY MEDICINE

## 2022-11-29 PROCEDURE — G8536 NO DOC ELDER MAL SCRN: HCPCS | Performed by: FAMILY MEDICINE

## 2022-11-29 RX ORDER — KETOROLAC TROMETHAMINE 5 MG/ML
SOLUTION OPHTHALMIC
COMMUNITY
Start: 2022-11-18

## 2022-11-29 RX ORDER — LOSARTAN POTASSIUM 50 MG/1
50 TABLET ORAL DAILY
Qty: 90 TABLET | Refills: 1 | Status: SHIPPED | OUTPATIENT
Start: 2022-11-29

## 2022-11-29 NOTE — PROGRESS NOTES
Erma Woods  74 y.o. male  1950  GG  [facility] FAMILY MEDICINE  Progress Note     Encounter Date: 2022    Assessment and Plan:     Encounter Diagnoses     ICD-10-CM ICD-9-CM   1. Medicare annual wellness visit, subsequent  Z00.00 V70.0   2. Screening for colon cancer  Z12.11 V76.51   3. Benign prostatic hyperplasia, unspecified whether lower urinary tract symptoms present  N40.0 600.00   4. Essential hypertension  I10 401.9   5. Controlled type 2 diabetes mellitus without complication, without long-term current use of insulin (HCC)  E11.9 250.00   6. Coronary artery disease involving native coronary artery of native heart without angina pectoris  I25.10 414.01       1. Medicare annual wellness visit, subsequent  Updated    2. Screening for colon cancer  Back to Dr. Miguel Angel Murillo for repeat screening  - REFERRAL TO GASTROENTEROLOGY    3. Benign prostatic hyperplasia, unspecified whether lower urinary tract symptoms present  Check psa  - PSA, DIAGNOSTIC (PROSTATE SPECIFIC AG); Future    4. Essential hypertension  Start losartan   Recheck 6 weeks. - METABOLIC PANEL, BASIC; Future  - MICROALBUMIN, UR, RAND W/ MICROALB/CREAT RATIO; Future  - losartan (COZAAR) 50 mg tablet; Take 1 Tablet by mouth daily. Dispense: 90 Tablet; Refill: 1    5. Controlled type 2 diabetes mellitus without complication, without long-term current use of insulin (HCC)  Check status,   Diet discussed with patient and wife  - CBC WITH AUTOMATED DIFF; Future  - HEMOGLOBIN A1C WITH EAG; Future  -  DIABETES FOOT EXAM    6. Coronary artery disease involving native coronary artery of native heart without angina pectoris  Pain free, sees Dr. Radha Martinez; Future  - LIPID PANEL; Future    I have discussed the diagnosis with the patient and the intended plan as seen in the above orders. he has expressed understanding.   The patient has received an after-visit summary and questions were answered concerning future plans. I have discussed medication side effects and warnings with the patient as well. Electronically Signed: Carlos Leo MD    Current Medications after this visit     Current Outpatient Medications   Medication Sig    ketorolac (ACULAR) 0.5 % ophthalmic solution     losartan (COZAAR) 50 mg tablet Take 1 Tablet by mouth daily. BRILINTA 90 mg tablet Pt is taking it twice a week. Pt is tapering off. EPINEPHrine (EPIPEN) 0.3 mg/0.3 mL injection     Cholecalciferol, Vitamin D3, 1,000 unit chew Take 0.5 Tabs by mouth. flaxseed oil 1,000 mg cap Take 1,000 mg by mouth two (2) times a day. glucose blood VI test strips (FREESTYLE LITE STRIPS) strip Test fasting blood sugar twice daily. Dx E11.9    Blood-Glucose Meter (FREESTYLE FREEDOM LITE) monitoring kit For blood sugar testing twice daily. Dx E11.9    Lancets (FREESTYLE LANCETS) misc For blood sugar testing twice daily. Dx E11.9    aspirin delayed-release 81 mg tablet Take 1 Tab by mouth daily. No current facility-administered medications for this visit. Medications Discontinued During This Encounter   Medication Reason    varicella-zoster recombinant, PF, (Shingrix, PF,) 50 mcg/0.5 mL susr injection Not A Current Medication     ~~~~~~~~~~~~~~~~~~~~~~~~~~~~~~~~~~~~~~~~~~~~~~~~~~~~~~~~~~~    Chief Complaint   Patient presents with    Follow-up    Blood Pressure Check       History provided by patient and wife. History of Present Illness   Nancy Mccallum is a 74 y.o. male who presents to clinic today for:  Follow-up and Blood Pressure Check    BP was up at both eye doctors for cataract and dental clinic  Up here and at home. Never treated previously  Non smoker  Non drinker  No history of kidney issues  Cholesterol has been up in the past.  Snores some but no apnea per wife    DM2  Diet only.   Sticking to diet  No sweet drinks  Some fruit     Cholesterol status  No current meds  Has a stent from  [facility]  Statin? Check labs    Health Maintenance  Completed HM gaps at today's visit  Health Maintenance Due   Topic Date Due    Depression Screen  Never done    Shingrix Vaccine Age 49> (1 of 2) Never done    Colorectal Cancer Screening Combo  07/06/2021    MICROALBUMIN Q1  10/26/2021    Lipid Screen  10/26/2021    COVID-19 Vaccine (4 - Booster for Pfizer series) 11/24/2021    A1C test (Diabetic or Prediabetic)  03/22/2022    Flu Vaccine (1) 08/01/2022     Review of Systems   Review of Systems   Respiratory:  Negative for shortness of breath. Cardiovascular:  Negative for chest pain, palpitations and leg swelling. Gastrointestinal:  Negative for blood in stool. Genitourinary:  Negative for hematuria. Psychiatric/Behavioral: Negative. Vitals/Objective:     Vitals:    11/29/22 1044 11/29/22 1054   BP: (!) 157/72 (!) 145/89   Pulse: 78    Temp: 98.6 °F (37 °C)    TempSrc: Temporal    SpO2: 100%    Weight: 183 lb (83 kg)    Height: 5' 7.5\" (1.715 m)      Body mass index is 28.24 kg/m². Wt Readings from Last 3 Encounters:   11/29/22 183 lb (83 kg)   11/28/22 186 lb (84.4 kg)   10/21/21 186 lb 6.4 oz (84.6 kg)         Objective  Physical Exam  Vitals and nursing note reviewed. Constitutional:       Appearance: Normal appearance. He is not toxic-appearing. HENT:      Head: Normocephalic and atraumatic. Cardiovascular:      Rate and Rhythm: Normal rate and regular rhythm. Heart sounds: Normal heart sounds. No murmur heard. No gallop. Pulmonary:      Effort: Pulmonary effort is normal. No respiratory distress. Breath sounds: Normal breath sounds. No wheezing, rhonchi or rales. Musculoskeletal:      Cervical back: No muscular tenderness. Lymphadenopathy:      Cervical: No cervical adenopathy. Neurological:      Mental Status: He is alert. Psychiatric:         Mood and Affect: Mood normal.         Behavior: Behavior normal.         Thought Content:  Thought content normal. Judgment: Judgment normal.        Diabetic Foot Exam:  Protective sensation is intact bilaterally. Pedal pulses are 2+ and normal bilaterally. L foot skin inspection:  normal skin and soft tissue with no gross edema or evidence of acute injury or foot ulcer  R foot skin inspection:  skin and soft tissue appear normal with no significant edema or evidence of acute injury or foot ulcer      No results found for this or any previous visit (from the past 24 hour(s)). Disposition     Follow-up and Dispositions    Return in about 6 weeks (around 1/10/2023) for Blood pressure follow up, Diabetes follow up, Medication follow up. No future appointments. History   Patient's past medical, surgical and family histories were reviewed and updated.     Past Medical History:   Diagnosis Date    CAD (coronary artery disease)     hypercholesterolemia    Hypercholesterolemia     difficulty tolerating statins    Hypertension     Myocardial infarct ([facility] [address] 08/25/2019    Stents in 1996 and 2019, Dr. Tay Valdez      Past Surgical History:   Procedure Laterality Date    HX CATARACT REMOVAL Bilateral 10/2022    HX COLONOSCOPY  07/2011    normal, Dr. Tay Saucedo, repeat 2011    HX HEENT      wisdom teeth extraction    HX HERNIA REPAIR      inguinal hernia    HX HERNIA REPAIR      bilateral inguinal    HX ORTHOPAEDIC      2nd L toe joint removal    HX ORTHOPAEDIC Right 12/2017    right thumb     IN CARDIAC SURG PROCEDURE UNLIST  11/1996    stent placed x2, 3 more 8/2019, Dr. Tay Valdez     Family History   Problem Relation Age of Onset    Hypertension Mother     Cancer Father         prostate    Diabetes Father     Heart Disease Father     Hypertension Father     Alzheimer's Disease Father     Heart Disease Sister     Thyroid Disease Sister     Cancer Brother         prostate cancerr    Hypertension Brother     No Known Problems Daughter     No Known Problems Son      Social History     Tobacco Use    Smoking status: Former     Packs/day: 0.25 Years: 35.00     Pack years: 8.75     Types: Cigarettes     Quit date: 2012     Years since quittin.9    Smokeless tobacco: Never   Vaping Use    Vaping Use: Never used   Substance Use Topics    Alcohol use: Yes     Comment: 1/month    Drug use: Never       Allergies     Allergies   Allergen Reactions    Atorvastatin Myalgia     forgetfullness  Other reaction(s): Myalgia  forgetfullness    Simvastatin Other (comments)     forgetfullness    Other reaction(s): Other (comments)  forgetfullness    Sulfa (Sulfonamide Antibiotics) Unknown (comments)     Patient is unaware    Unable To Obtain Unable to Obtain     Other reaction(s): Unknown (comments)  Other reaction(s): Unknown (comments)  Patient is unaware                     This is the Subsequent Medicare Annual Wellness Exam, performed 12 months or more after the Initial AWV or the last Subsequent AWV    I have reviewed the patient's medical history in detail and updated the computerized patient record. Assessment/Plan   Education and counseling provided:  Are appropriate based on today's review and evaluation  End-of-Life planning (with patient's consent)    1. Medicare annual wellness visit, subsequent  2. Screening for colon cancer  -     REFERRAL TO GASTROENTEROLOGY  3. Benign prostatic hyperplasia, unspecified whether lower urinary tract symptoms present  -     PSA, DIAGNOSTIC (PROSTATE SPECIFIC AG); Future  4. Essential hypertension  -     METABOLIC PANEL, BASIC; Future  -     MICROALBUMIN, UR, RAND W/ MICROALB/CREAT RATIO; Future  -     losartan (COZAAR) 50 mg tablet; Take 1 Tablet by mouth daily. , Normal, Disp-90 Tablet, R-1  5. Controlled type 2 diabetes mellitus without complication, without long-term current use of insulin (HCC)  -     CBC WITH AUTOMATED DIFF; Future  -     HEMOGLOBIN A1C WITH EAG; Future  -      DIABETES FOOT EXAM  6.  Coronary artery disease involving native coronary artery of native heart without angina pectoris  - HEPATIC FUNCTION PANEL; Future  -     LIPID PANEL; Future     Depression Risk Factor Screening     3 most recent PHQ Screens 11/29/2022   PHQ Not Done -   Little interest or pleasure in doing things Not at all   Feeling down, depressed, irritable, or hopeless Not at all   Total Score PHQ 2 0       Alcohol & Drug Abuse Risk Screen    Do you average more than 1 drink per night or more than 7 drinks a week: No    In the past three months have you have had more than 4 drinks containing alcohol on one occasion: No    Non smoker        Functional Ability and Level of Safety    Hearing: Hearing is good. Eyes are up to date   Dental up to date   Activities of Daily Living: The home contains: grab bars  Patient does total self care      Ambulation: with mild difficulty     Fall Risk:  Fall Risk Assessment, last 12 mths 11/29/2022   Able to walk?  Yes   Fall in past 12 months? 0   Do you feel unsteady? -   Are you worried about falling -      Abuse Screen:  Patient is not abused       Cognitive Screening    Has your family/caregiver stated any concerns about your memory: no     Cognitive Screening: Normal - interview    Health Maintenance Due     Health Maintenance Due   Topic Date Due    Depression Screen  Never done    Shingrix Vaccine Age 49> (1 of 2) Never done    Colorectal Cancer Screening Combo  07/06/2021    MICROALBUMIN Q1  10/26/2021    Lipid Screen  10/26/2021    COVID-19 Vaccine (4 - Booster for Pfizer series) 11/24/2021    A1C test (Diabetic or Prediabetic)  03/22/2022    Flu Vaccine (1) 08/01/2022       Patient Care Team   Patient Care Team:  Karen Kevin MD as PCP - General (Family Medicine)  Karen Kevin MD as PCP [facility]  Bshsi, Not On File, NP    History     Patient Active Problem List   Diagnosis Code    CAD (coronary artery disease) I25.10    Hyperlipidemia E78.5    Advanced care planning/counseling discussion Z71.89    Diabetes mellitus type 2, diet-controlled ([facility] [address] E11.9    Pure hypercholesterolemia E78.00     Past Medical History:   Diagnosis Date    CAD (coronary artery disease)     hypercholesterolemia    Hypercholesterolemia     difficulty tolerating statins    Hypertension     Myocardial infarct ([address] 08/25/2019    Stents in 1996 and 2019, Dr. Julia Thompson       Past Surgical History:   Procedure Laterality Date    HX CATARACT REMOVAL Bilateral 10/2022    HX COLONOSCOPY  07/2011    normal, Dr. Tad Engle, repeat 2011    HX HEENT      wisdom teeth extraction    HX HERNIA REPAIR      inguinal hernia    HX HERNIA REPAIR      bilateral inguinal    HX ORTHOPAEDIC      2nd L toe joint removal    HX ORTHOPAEDIC Right 12/2017    right thumb     MT CARDIAC SURG PROCEDURE UNLIST  11/1996    stent placed x2, 3 more 8/2019, Dr. Julia Thompson     Current Outpatient Medications   Medication Sig Dispense Refill    ketorolac (ACULAR) 0.5 % ophthalmic solution       losartan (COZAAR) 50 mg tablet Take 1 Tablet by mouth daily. 90 Tablet 1    BRILINTA 90 mg tablet Pt is taking it twice a week. Pt is tapering off. EPINEPHrine (EPIPEN) 0.3 mg/0.3 mL injection       Cholecalciferol, Vitamin D3, 1,000 unit chew Take 0.5 Tabs by mouth. flaxseed oil 1,000 mg cap Take 1,000 mg by mouth two (2) times a day. glucose blood VI test strips (FREESTYLE LITE STRIPS) strip Test fasting blood sugar twice daily. Dx E11.9 180 Strip 11    Blood-Glucose Meter (FREESTYLE FREEDOM LITE) monitoring kit For blood sugar testing twice daily. Dx E11.9 1 Kit 0    Lancets (FREESTYLE LANCETS) misc For blood sugar testing twice daily. Dx E11.9 200 Each 11    aspirin delayed-release 81 mg tablet Take 1 Tab by mouth daily. Allergies   Allergen Reactions    Atorvastatin Myalgia     forgetfullness  Other reaction(s): Myalgia  forgetfullness    Simvastatin Other (comments)     forgetfullness    Other reaction(s):  Other (comments)  forgetfullness    Sulfa (Sulfonamide Antibiotics) Unknown (comments)     Patient is unaware Unable To Obtain Unable to Obtain     Other reaction(s): Unknown (comments)  Other reaction(s): Unknown (comments)  Patient is unaware       Family History   Problem Relation Age of Onset    Hypertension Mother     Cancer Father         prostate    Diabetes Father     Heart Disease Father     Hypertension Father     Alzheimer's Disease Father     Heart Disease Sister     Thyroid Disease Sister     Cancer Brother         prostate cancerr    Hypertension Brother     No Known Problems Daughter     No Known Problems Son      Social History     Tobacco Use    Smoking status: Former     Packs/day: 0.25     Years: 35.00     Pack years: 8.75     Types: Cigarettes     Quit date: 2012     Years since quittin.9    Smokeless tobacco: Never   Substance Use Topics    Alcohol use: Yes     Comment: Lazarus Familia, MD

## 2022-11-29 NOTE — PROGRESS NOTES
Identified pt with two pt identifiers. Reviewed record in preparation for visit and have obtained necessary documentation. All patient medications has been reviewed. Chief Complaint   Patient presents with    Follow-up    Blood Pressure Check     Additional information about chief complaint:    Visit Vitals  BP (!) 145/89 (BP 1 Location: Right upper arm, BP Patient Position: Sitting, BP Cuff Size: Adult)   Pulse 78   Temp 98.6 °F (37 °C) (Temporal)   Ht 5' 7.5\" (1.715 m)   Wt 183 lb (83 kg)   SpO2 100%   BMI 28.24 kg/m²       Health Maintenance Due   Topic    Depression Screen     Shingrix Vaccine Age 50> (1 of 2)    Colorectal Cancer Screening Combo     Foot Exam Q1     Medicare Yearly Exam     MICROALBUMIN Q1     Lipid Screen     COVID-19 Vaccine (4 - Booster for Pfizer series)    A1C test (Diabetic or Prediabetic)     Flu Vaccine (1)       1. Have you been to the ER, urgent care clinic since your last visit? Hospitalized since your last visit? No    2. Have you seen or consulted any other health care providers outside of the [address] [place] [address] since your last visit? Include any pap smears or colon screening.  No

## 2022-11-29 NOTE — PATIENT INSTRUCTIONS
Medicare Wellness Visit, Male    The best way to live healthy is to have a lifestyle where you eat a well-balanced diet, exercise regularly, limit alcohol use, and quit all forms of tobacco/nicotine, if applicable. Regular preventive services are another way to keep healthy. Preventive services (vaccines, screening tests, monitoring & exams) can help personalize your care plan, which helps you manage your own care. Screening tests can find health problems at the earliest stages, when they are easiest to treat. [facility] follows the current, evidence-based guidelines published by the [facility] ([facility]) when recommending preventive services for our patients. Because we follow these guidelines, sometimes recommendations change over time as research supports it. (For example, a prostate screening blood test is no longer routinely recommended for men with no symptoms). Of course, you and your doctor may decide to screen more often for some diseases, based on your risk and co-morbidities (chronic disease you are already diagnosed with). Preventive services for you include:  - Medicare offers their members a free annual wellness visit, which is time for you and your primary care provider to discuss and plan for your preventive service needs. Take advantage of this benefit every year!  -All adults over age 72 should receive the recommended pneumonia vaccines. Current USPSTF guidelines recommend a series of two vaccines for the best pneumonia protection.   -All adults should have a flu vaccine yearly and tetanus vaccine every 10 years.  -All adults age 48 and older should receive the shingles vaccines (series of two vaccines).        -All adults age 38-68 who are overweight should have a diabetes screening test once every three years.   -Other screening tests & preventive services for persons with diabetes include: an eye exam to screen for diabetic retinopathy, a kidney function test, a foot exam, and stricter control over your cholesterol.   -Cardiovascular screening for adults with routine risk involves an electrocardiogram (ECG) at intervals determined by the provider.   -Colorectal cancer screening should be done for adults age 54-65 with no increased risk factors for colorectal cancer. There are a number of acceptable methods of screening for this type of cancer. Each test has its own benefits and drawbacks. Discuss with your provider what is most appropriate for you during your annual wellness visit. The different tests include: colonoscopy (considered the best screening method), a fecal occult blood test, a fecal DNA test, and sigmoidoscopy.  -All adults born between [facility] should be screened once for Hepatitis C.  -An Abdominal Aortic Aneurysm (AAA) Screening is recommended for men age 73-68 who has ever smoked in their lifetime. Here is a list of your current Health Maintenance items (your personalized list of preventive services) with a due date:  Health Maintenance Due   Topic Date Due    Depresssion Screening  Never done    Shingles Vaccine (1 of 2) Never done    Colorectal Screening  07/06/2021    Diabetic Foot Care  10/23/2021    Albumin Urine Test  10/26/2021    Cholesterol Test   10/26/2021    COVID-19 Vaccine (4 - Booster for Pfizer series) 11/24/2021    Hemoglobin A1C    03/22/2022    Yearly Flu Vaccine (1) 08/01/2022     Diabetes:  Blood sugar goals:  Hemoglobin A1c under 7  Fasting blood sugar   Blood sugar 2 hours after a meal under 180, 4 hours after a meal under 120  No hypoglycemia (sugars under 70 and symptomatic low sugars)    Blood sugar control with diet and exercise:  Exercise 45 minutes per day. This makes your insulin work better. It also allows insulin levels to fall helping with weight loss. Every night, try to fast from your evening meal to breakfast (at least 12 hours) without eating anything.   This uses stored energy in your liver and makes insulin work better. Avoid simples sugars such as table sugar in drinks (sodas, lemonade, sweet tea, wine), desserts, candy. Also avoid fruit juices and high fructose corn syrup. Avoid frequent consumption of fruit especially grapes and bananas. A single serving of fruit daily is all you should have. Finally, drink less milk which has milk sugar in it. Control your starch intake. Men should have 3-4 carb portions per meal.  Women should have 2-3 carb portions per meal.  A carb serving is the equivalent of a slice of bread. Control your blood pressure and cholesterol. These problems are common in diabetes. AND, don't smoke. All of these problems contribute to heart disease and stroke risk. Get a yearly eye exam and flu shot. Make sure your vaccines are up to date particularly the pneumonia vaccines. Inspect your feet daily. Wear comfortable protective shoes and clean socks. Seek medical care if there are sore, calluses or numbness and burning of your feet. See your doctor at least every 6 months if you are on oral medicines or more often if the diabetic control is not at goal or if you are on insulin. Take your medicines religiously. The goal blood pressure for most patients is 140/90. The whole point of treating blood pressure is to prevent strokes, heart attacks and kidney damage. Persistently elevated blood pressure damages blood vessels and can lead to catastrophic heart problems and strokes. Recommendations for Hypertension (elevated blood pressure):    Purchase a blood pressure cuff that goes around your upper arm and check blood pressure at least 3 times per week. Write down your numbers for review. Check blood pressure in the morning and evening.  Rest for 5 minutes with feet elevated and arm resting on a table (at mid-chest level) when checking blood pressure    Exercise 30-60 minutes most days of the week, preferably with a mix of cardiovascular and strength training. Exercise can improve blood pressure, even if you do not lose weight! Limit alcohol intake to less than 3-4 drinks per week. Avoid tobacco products    Avoid illegal drugs especially amphetamines  DASH diet - includes fruits, vegetables, fiber, and less than 2000 mg sodium (salt) daily. Try to eat a low sugar diet. Sugar worsens diabetes and activates kidney hormones that raise blood pressure. Avoid non-steroidal inflammatory medications (NSAIDS) such as ibuprofen, advil, motrin, aleve, and naproxyn as these may increase blood pressure if used long-term    Avoid OTC decongestants such as pseudopherine, phenylephrine, Afrin  Take your blood pressure medicine (and aspirin if prescribed) religiously         STOP the SUGAR    The first step in dietary efforts at weight loss is removing as much sugar from the diet as possible. Most dietary sugar is in the forms of table sugar (sucrose), fruit sugar (fructose) and milk sugar (lactose). But, you need to watch labels to see if processed foods have added sugars under other names. To eliminate sucrose, eliminate sweet drinks entirely including soft drinks, sports drinks, lemonade, sweet tea and wine. Also, eliminate candy and make desserts a \"special occasion only treat\". Don't eat desserts with every meal or every night. Most fructose is found in fruit and this should be markedly limited. Fruit juice should be avoided. If you do eat fruit, eat fruits that are lower in sugar such as: strawberries, blackberries, raspberries, lemon, grapefruit and watermelon. Eat small portions and think of the fruit as a garnish or small treat. Bananas, mangos, cherries and grapes are higher in sugar and should be avoided. Avoid high fructose corn syrup (an artificial sweetener). Milk sugar, or lactose, should be avoided as well. Milk is a good source of calcium but not for people struggling with weight issues.  Put a little cream in your coffee or tea but otherwise avoid milk. How can you avoid sugar? For starters, don't buy it and don't bring it in the house. It won't tempt you that way!     For more information:    Try the internet for videos about sugar addiction or try diet doctor.Funny Or Die.

## 2022-11-29 NOTE — PROGRESS NOTES
Order for Dennise fax  [phone] to Dr Noelle Steinberg office  with office 0 = understands/communicates without difficulty

## 2022-11-30 ENCOUNTER — TELEPHONE (OUTPATIENT)
Dept: FAMILY MEDICINE CLINIC | Age: 72
End: 2022-11-30

## 2022-11-30 ENCOUNTER — LAB ONLY (OUTPATIENT)
Dept: FAMILY MEDICINE CLINIC | Age: 72
End: 2022-11-30

## 2022-11-30 DIAGNOSIS — F17.211 NICOTINE DEPENDENCE, CIGARETTES, IN REMISSION: ICD-10-CM

## 2022-11-30 DIAGNOSIS — E11.9 CONTROLLED TYPE 2 DIABETES MELLITUS WITHOUT COMPLICATION, WITHOUT LONG-TERM CURRENT USE OF INSULIN (HCC): ICD-10-CM

## 2022-11-30 DIAGNOSIS — N40.0 BENIGN PROSTATIC HYPERPLASIA, UNSPECIFIED WHETHER LOWER URINARY TRACT SYMPTOMS PRESENT: ICD-10-CM

## 2022-11-30 DIAGNOSIS — I10 ESSENTIAL HYPERTENSION: ICD-10-CM

## 2022-11-30 DIAGNOSIS — Z12.2 SCREENING FOR LUNG CANCER: Primary | ICD-10-CM

## 2022-11-30 DIAGNOSIS — I25.10 CORONARY ARTERY DISEASE INVOLVING NATIVE CORONARY ARTERY OF NATIVE HEART WITHOUT ANGINA PECTORIS: ICD-10-CM

## 2022-11-30 NOTE — TELEPHONE ENCOUNTER
Patient is requesting a CT Low Dose Lung Screening. . this a a yearly test that needs to be done. Looks like last one was done 10/2019.      Would like orders placed in chart       Please advise

## 2022-12-01 ENCOUNTER — DOCUMENTATION ONLY (OUTPATIENT)
Dept: FAMILY MEDICINE CLINIC | Age: 72
End: 2022-12-01

## 2022-12-01 LAB
ALBUMIN SERPL-MCNC: 3.6 G/DL (ref 3.5–5)
ALBUMIN/GLOB SERPL: 1.2 {RATIO} (ref 1.1–2.2)
ALP SERPL-CCNC: 79 U/L (ref 45–117)
ALT SERPL-CCNC: 30 U/L (ref 12–78)
ANION GAP SERPL CALC-SCNC: 3 MMOL/L (ref 5–15)
AST SERPL-CCNC: 17 U/L (ref 15–37)
BASOPHILS # BLD: 0.1 K/UL (ref 0–0.1)
BASOPHILS NFR BLD: 1 % (ref 0–1)
BILIRUB DIRECT SERPL-MCNC: <0.1 MG/DL (ref 0–0.2)
BILIRUB SERPL-MCNC: 0.4 MG/DL (ref 0.2–1)
BUN SERPL-MCNC: 18 MG/DL (ref 6–20)
BUN/CREAT SERPL: 20 (ref 12–20)
CALCIUM SERPL-MCNC: 8.8 MG/DL (ref 8.5–10.1)
CHLORIDE SERPL-SCNC: 106 MMOL/L (ref 97–108)
CHOLEST SERPL-MCNC: 174 MG/DL
CO2 SERPL-SCNC: 29 MMOL/L (ref 21–32)
CREAT SERPL-MCNC: 0.91 MG/DL (ref 0.7–1.3)
CREAT UR-MCNC: 119 MG/DL
DIFFERENTIAL METHOD BLD: ABNORMAL
EOSINOPHIL # BLD: 0.2 K/UL (ref 0–0.4)
EOSINOPHIL NFR BLD: 3 % (ref 0–7)
ERYTHROCYTE [DISTWIDTH] IN BLOOD BY AUTOMATED COUNT: 12.5 % (ref 11.5–14.5)
EST. AVERAGE GLUCOSE BLD GHB EST-MCNC: 151 MG/DL
GLOBULIN SER CALC-MCNC: 3.1 G/DL (ref 2–4)
GLUCOSE SERPL-MCNC: 168 MG/DL (ref 65–100)
HBA1C MFR BLD: 6.9 % (ref 4–5.6)
HCT VFR BLD AUTO: 44.2 % (ref 36.6–50.3)
HDLC SERPL-MCNC: 42 MG/DL
HDLC SERPL: 4.1 {RATIO} (ref 0–5)
HGB BLD-MCNC: 14.4 G/DL (ref 12.1–17)
IMM GRANULOCYTES # BLD AUTO: 0 K/UL (ref 0–0.04)
IMM GRANULOCYTES NFR BLD AUTO: 0 % (ref 0–0.5)
LDLC SERPL CALC-MCNC: 104 MG/DL (ref 0–100)
LYMPHOCYTES # BLD: 1.7 K/UL (ref 0.8–3.5)
LYMPHOCYTES NFR BLD: 24 % (ref 12–49)
MCH RBC QN AUTO: 32.6 PG (ref 26–34)
MCHC RBC AUTO-ENTMCNC: 32.6 G/DL (ref 30–36.5)
MCV RBC AUTO: 100 FL (ref 80–99)
MICROALBUMIN UR-MCNC: 1.17 MG/DL
MICROALBUMIN/CREAT UR-RTO: 10 MG/G (ref 0–30)
MONOCYTES # BLD: 0.5 K/UL (ref 0–1)
MONOCYTES NFR BLD: 7 % (ref 5–13)
NEUTS SEG # BLD: 4.6 K/UL (ref 1.8–8)
NEUTS SEG NFR BLD: 65 % (ref 32–75)
NRBC # BLD: 0 K/UL (ref 0–0.01)
NRBC BLD-RTO: 0 PER 100 WBC
PLATELET # BLD AUTO: 216 K/UL (ref 150–400)
PMV BLD AUTO: 9.6 FL (ref 8.9–12.9)
POTASSIUM SERPL-SCNC: 4.1 MMOL/L (ref 3.5–5.1)
PROT SERPL-MCNC: 6.7 G/DL (ref 6.4–8.2)
PSA SERPL-MCNC: 1 NG/ML (ref 0.01–4)
RBC # BLD AUTO: 4.42 M/UL (ref 4.1–5.7)
SODIUM SERPL-SCNC: 138 MMOL/L (ref 136–145)
TRIGL SERPL-MCNC: 140 MG/DL (ref ?–150)
VLDLC SERPL CALC-MCNC: 28 MG/DL
WBC # BLD AUTO: 7.1 K/UL (ref 4.1–11.1)

## 2022-12-01 NOTE — PROGRESS NOTES
Spoke with patient CT is scheduled for 12/8/22 @ 1:30pm  arrival time 1:00 pm     Patient verbally understood.

## 2022-12-01 NOTE — PROGRESS NOTES
Your blood work is ok. The PSA is normal.  The diabetic control remains good so stick to your diet and exercise as we discussed. The cholesterol control is good. See me to recheck the blood pressure after the holidays.   [facility]

## 2022-12-08 ENCOUNTER — HOSPITAL ENCOUNTER (OUTPATIENT)
Dept: CT IMAGING | Age: 72
Discharge: HOME | End: 2022-12-08
Attending: FAMILY MEDICINE
Payer: MEDICARE

## 2022-12-08 VITALS — WEIGHT: 183 LBS | HEIGHT: 67 IN | BODY MASS INDEX: 28.72 KG/M2

## 2022-12-08 DIAGNOSIS — Z12.2 SCREENING FOR LUNG CANCER: ICD-10-CM

## 2022-12-08 DIAGNOSIS — F17.211 NICOTINE DEPENDENCE, CIGARETTES, IN REMISSION: ICD-10-CM

## 2022-12-08 PROCEDURE — 71271 CT THORAX LUNG CANCER SCR C-: CPT

## 2022-12-09 NOTE — PROGRESS NOTES
Your CT scan shows no cancer. It does show the heart issues you already have and the thyroid goiter we previously noted. You should have this scan repeated yearly.   [facility]

## 2022-12-22 ENCOUNTER — TELEPHONE (OUTPATIENT)
Dept: FAMILY MEDICINE CLINIC | Age: 72
End: 2022-12-22

## 2023-01-30 ENCOUNTER — CLINICAL SUPPORT (OUTPATIENT)
Dept: FAMILY MEDICINE CLINIC | Age: 73
End: 2023-01-30

## 2023-01-30 VITALS
TEMPERATURE: 98 F | WEIGHT: 190.6 LBS | RESPIRATION RATE: 16 BRPM | SYSTOLIC BLOOD PRESSURE: 132 MMHG | HEART RATE: 72 BPM | HEIGHT: 67 IN | BODY MASS INDEX: 29.91 KG/M2 | OXYGEN SATURATION: 98 % | DIASTOLIC BLOOD PRESSURE: 68 MMHG

## 2023-01-30 DIAGNOSIS — I10 ESSENTIAL HYPERTENSION: Primary | ICD-10-CM

## 2023-02-09 ENCOUNTER — OFFICE VISIT (OUTPATIENT)
Dept: FAMILY MEDICINE CLINIC | Age: 73
End: 2023-02-09
Payer: MEDICARE

## 2023-02-09 VITALS
RESPIRATION RATE: 20 BRPM | TEMPERATURE: 97.5 F | HEIGHT: 67 IN | DIASTOLIC BLOOD PRESSURE: 72 MMHG | HEART RATE: 86 BPM | SYSTOLIC BLOOD PRESSURE: 134 MMHG | BODY MASS INDEX: 29.54 KG/M2 | WEIGHT: 188.2 LBS | OXYGEN SATURATION: 99 %

## 2023-02-09 DIAGNOSIS — Z78.9 STATIN INTOLERANCE: ICD-10-CM

## 2023-02-09 DIAGNOSIS — E11.9 CONTROLLED TYPE 2 DIABETES MELLITUS WITHOUT COMPLICATION, WITHOUT LONG-TERM CURRENT USE OF INSULIN (HCC): ICD-10-CM

## 2023-02-09 DIAGNOSIS — I10 ESSENTIAL HYPERTENSION: Primary | ICD-10-CM

## 2023-02-09 PROCEDURE — 3046F HEMOGLOBIN A1C LEVEL >9.0%: CPT | Performed by: FAMILY MEDICINE

## 2023-02-09 PROCEDURE — G8417 CALC BMI ABV UP PARAM F/U: HCPCS | Performed by: FAMILY MEDICINE

## 2023-02-09 PROCEDURE — G8427 DOCREV CUR MEDS BY ELIG CLIN: HCPCS | Performed by: FAMILY MEDICINE

## 2023-02-09 PROCEDURE — 3017F COLORECTAL CA SCREEN DOC REV: CPT | Performed by: FAMILY MEDICINE

## 2023-02-09 PROCEDURE — 2022F DILAT RTA XM EVC RTNOPTHY: CPT | Performed by: FAMILY MEDICINE

## 2023-02-09 PROCEDURE — 99214 OFFICE O/P EST MOD 30 MIN: CPT | Performed by: FAMILY MEDICINE

## 2023-02-09 PROCEDURE — 3077F SYST BP >= 140 MM HG: CPT | Performed by: FAMILY MEDICINE

## 2023-02-09 PROCEDURE — G8510 SCR DEP NEG, NO PLAN REQD: HCPCS | Performed by: FAMILY MEDICINE

## 2023-02-09 PROCEDURE — 1101F PT FALLS ASSESS-DOCD LE1/YR: CPT | Performed by: FAMILY MEDICINE

## 2023-02-09 PROCEDURE — 1123F ACP DISCUSS/DSCN MKR DOCD: CPT | Performed by: FAMILY MEDICINE

## 2023-02-09 PROCEDURE — G8536 NO DOC ELDER MAL SCRN: HCPCS | Performed by: FAMILY MEDICINE

## 2023-02-09 PROCEDURE — 3078F DIAST BP <80 MM HG: CPT | Performed by: FAMILY MEDICINE

## 2023-02-09 RX ORDER — LOSARTAN POTASSIUM 50 MG/1
50 TABLET ORAL DAILY
Qty: 90 TABLET | Refills: 1 | Status: SHIPPED | OUTPATIENT
Start: 2023-02-09

## 2023-02-09 NOTE — PATIENT INSTRUCTIONS
The goal blood pressure for most patients is 140/90. The whole point of treating blood pressure is to prevent strokes, heart attacks and kidney damage. Persistently elevated blood pressure damages blood vessels and can lead to catastrophic heart problems and strokes. Recommendations for Hypertension (elevated blood pressure):    Purchase a blood pressure cuff that goes around your upper arm and check blood pressure at least 3 times per week. Write down your numbers for review. Check blood pressure in the morning and evening. Rest for 5 minutes with feet elevated and arm resting on a table (at mid-chest level) when checking blood pressure    Exercise 30-60 minutes most days of the week, preferably with a mix of cardiovascular and strength training. Exercise can improve blood pressure, even if you do not lose weight! Limit alcohol intake to less than 3-4 drinks per week. Avoid tobacco products    Avoid illegal drugs especially amphetamines  DASH diet - includes fruits, vegetables, fiber, and less than 2000 mg sodium (salt) daily. Try to eat a low sugar diet. Sugar worsens diabetes and activates kidney hormones that raise blood pressure. Avoid non-steroidal inflammatory medications (NSAIDS) such as ibuprofen, advil, motrin, aleve, and naproxyn as these may increase blood pressure if used long-term    Avoid OTC decongestants such as pseudopherine, phenylephrine, Afrin  Take your blood pressure medicine (and aspirin if prescribed) religiously       Diabetes:  Blood sugar goals:  Hemoglobin A1c under 7  Fasting blood sugar   Blood sugar 2 hours after a meal under 180, 4 hours after a meal under 120  No hypoglycemia (sugars under 70 and symptomatic low sugars)    Blood sugar control with diet and exercise:  Exercise 45 minutes per day. This makes your insulin work better. It also allows insulin levels to fall helping with weight loss.   Every night, try to fast from your evening meal to breakfast (at least 12 hours) without eating anything. This uses stored energy in your liver and makes insulin work better. Avoid simples sugars such as table sugar in drinks (sodas, lemonade, sweet tea, wine), desserts, candy. Also avoid fruit juices and high fructose corn syrup. Avoid frequent consumption of fruit especially grapes and bananas. A single serving of fruit daily is all you should have. Finally, drink less milk which has milk sugar in it. Control your starch intake. Men should have 3-4 carb portions per meal.  Women should have 2-3 carb portions per meal.  A carb serving is the equivalent of a slice of bread. Control your blood pressure and cholesterol. These problems are common in diabetes. AND, don't smoke. All of these problems contribute to heart disease and stroke risk. Get a yearly eye exam and flu shot. Make sure your vaccines are up to date particularly the pneumonia vaccines. Inspect your feet daily. Wear comfortable protective shoes and clean socks. Seek medical care if there are sore, calluses or numbness and burning of your feet. See your doctor at least every 6 months if you are on oral medicines or more often if the diabetic control is not at goal or if you are on insulin. Take your medicines religiously. STOP the SUGAR    The first step in dietary efforts at weight loss is removing as much sugar from the diet as possible. Most dietary sugar is in the forms of table sugar (sucrose), fruit sugar (fructose) and milk sugar (lactose). But, you need to watch labels to see if processed foods have added sugars under other names. To eliminate sucrose, eliminate sweet drinks entirely including soft drinks, sports drinks, lemonade, sweet tea and wine. Also, eliminate candy and make desserts a \"special occasion only treat\". Don't eat desserts with every meal or every night. Most fructose is found in fruit and this should be markedly limited.   Fruit juice should be avoided. If you do eat fruit, eat fruits that are lower in sugar such as: strawberries, blackberries, raspberries, lemon, grapefruit and watermelon. Eat small portions and think of the fruit as a garnish or small treat. Bananas, mangos, cherries and grapes are higher in sugar and should be avoided. Avoid high fructose corn syrup (an artificial sweetener). Milk sugar, or lactose, should be avoided as well. Milk is a good source of calcium but not for people struggling with weight issues. Put a little cream in your coffee or tea but otherwise avoid milk. How can you avoid sugar? For starters, don't buy it and don't bring it in the house. It won't tempt you that way! For more information:    Try the internet for videos about sugar addiction or try [employer]. So, what can I eat? 1) Protein-protein consumption promotes weight loss. Eat protein at every meal.  Good sources of protein include meat, fish, poultry and eggs. 2) More soluble fiber-soluble fiber helps us feel \"full\" and helps improve many markers for cardiovascular disease. But, we have to watch out for products with added sugar or large carb servings! Sources of soluble fiber include oatmeal, root vegetables such as sweet potatoes, turnips and carrots, vegetables such as broccoli and Brussel sprouts. 3) Healthy fats and oils-certain fats are better for our blood vessels and cardiovascular system. The oils in fish and seafood tend to be better for us as well as olive oil and the nuts on trees (walnuts, almonds, pistachios and hazelnuts). So, again, try to eat more fish. Use olive oil for cooking and for salad dressings. Nuts can be used in salads and in other dishes and they make a good low carb snack. 4) Non starchy vegetables-Green and yellow vegetables offer a lot of nutrients and very low amounts of carbs that can lead to weight gain.   Salads can add a lot to our diet and also pack in a lot of nutrients. Cautions:  avoid starchy vegetables such as potatoes, corn and peas. Also, be careful about what is added to vegetables such as fruit or salad dressings that contain sugar. 5) Full fat dairy products-Cheeses make a good snack or appetizer. Cottage cheese can be a good basis for breakfast.  Yogurt is a good addition to our diet but watch out for yogurt that has added sugar. Avoid milk. EXERCISE AND WEIGHT LOSS    You'll hear lots of different things about weight loss and exercise. There is controversy about how much exercise helps with weight loss. We'll review what you should do about exercise and a weight plan here. First, it is hard to lose weight just with exercise. It takes about 3500 extra calories burned to lose a single pound. To put exercise in perspective, most people burn about 150 calories per mile if they walk or run. Doing the math, it takes over 23 miles to burn up the energy to lose one pound. So if you want to lose weight, exercise by itself is unlikely to help with weight loss very much. You need to work on diet and exercise at the same time to lose weight. And, you need to work on your habits and emotions since they have huge impacts on eating behaviors. But, exercise does help with weight loss. If you exercise, you burn stored fat in your muscles and that allows the levels of insulin in your body to fall. This allows the enzyme that burns fat to \"switch on\" and use stored fat for energy. That combined with a no sugar, lower starch diet combines to promote weight loss. Think of it this way:  exercise permits weight loss! Most people that lose weight and keep it off exercise. What's the right exercise? The best exercise is the one you enjoy and can keep doing!   Exercise that makes you feel good physically and makes you feel good about yourself is ideal.      Exercise that elevates your heart rate for a sustained period such as running, biking, walking and swimming improves your cardiovascular fitness. Resistance exercises such as weight lifting, strength training or calisthenics also clearly improve cardiovascular health and weight control. Stretching and yoga help with flexibility and balance. Ideally, an exercise program should include all of these different types of exercise. How much should I exercise? For weight loss, you should aim to exercise 45 minutes per day, 5-6 days per week. When you exercise 45 minutes, you exhaust the supply of fat your muscles store for energy and you help you body turn on the enzyme that burns stored fat elsewhere. This is the minimum amount of exercise you should shoot for. Remember, you don't need to think of exercise as strictly an organized period of time where that's all you do. You can increase your exercise in all your daily activities. Walk more at work or school. If you can complete an errand by walking instead of driving, do it. Park farther away from the store if you go shopping and force yourself to walk farther. On breaks at work, walk around the office and greet your coworkers instead of sitting at your desk. Rishi Martinezman a few calories and enjoy the company of others. Go for a walk around the sports field while the kids practice sports. Take another parent with you. What are other benefits of exercise? While exercise helps you lose weight, it is helping you in lots of other ways. Exercise lowers your risk of diabetes and high blood pressure and makes your heart healthier. It lowers your risk of heart attacks. Exercise can help chronic lung disease and congestive heart failure improve. Exercise lowers the risk of dementia including Alzheimer's disease. Exercise lowers the risk of some cancers and decreases the risk of osteoporosis. And interestingly, exercise helps with emotionally health and lowers the risk and severity of emotional illnesses such as depression.   Hernan Mccartney simply, exercise helps you live longer and better. What will help me keep up the exercise? Remember that exercise is your gift to yourself and your family. So schedule time for your exercise and be selfish about guarding that time. It's yours! Exercise with a friend or friends and make exercise a social activity that you look forward to. Friends keep each other honest and accountable. Think of how you feel when you exercise. Most people just feel better physically and emotionally. Remember that feeling and try to recapture it. Remember exercise will help you live longer and better and will help you be there for your children and grandchildren. Make that commitment for your family. And don't forget to tell yourself that while you feel better you look better! Rolf Thapa said it:  \"You look marvelous! \"

## 2023-02-09 NOTE — PROGRESS NOTES
Meggan Baker  74 y.o. male  1950  [id number]  [facility]  Progress Note     Encounter Date: 2/9/2023    Assessment and Plan:     Encounter Diagnoses     ICD-10-CM ICD-9-CM   1. Essential hypertension  I10 401.9   2. Controlled type 2 diabetes mellitus without complication, without long-term current use of insulin (HCC)  E11.9 250.00   3. Statin intolerance  Z78.9 995.27       1. Essential hypertension  Near goal here. At goal at home  - losartan (COZAAR) 50 mg tablet; Take 1 Tablet by mouth daily. Dispense: 90 Tablet; Refill: 1    2. Controlled type 2 diabetes mellitus without complication, without long-term current use of insulin ([address]  Working on diet and exercise  Due for repeat labs in May/June time frame. 3. Statin intolerance  Healthy oils    I have discussed the diagnosis with the patient and the intended plan as seen in the above orders. he has expressed understanding. The patient has received an after-visit summary and questions were answered concerning future plans. I have discussed medication side effects and warnings with the patient as well. Electronically Signed: David Booth MD    Current Medications after this visit     Current Outpatient Medications   Medication Sig    losartan (COZAAR) 50 mg tablet Take 1 Tablet by mouth daily. ketorolac (ACULAR) 0.5 % ophthalmic solution     EPINEPHrine (EPIPEN) 0.3 mg/0.3 mL injection     Cholecalciferol, Vitamin D3, 1,000 unit chew Take 0.5 Tabs by mouth. flaxseed oil 1,000 mg cap Take 1,000 mg by mouth two (2) times a day. glucose blood VI test strips (FREESTYLE LITE STRIPS) strip Test fasting blood sugar twice daily. Dx E11.9    Blood-Glucose Meter (FREESTYLE FREEDOM LITE) monitoring kit For blood sugar testing twice daily. Dx E11.9    Lancets (FREESTYLE LANCETS) [facility] For blood sugar testing twice daily. Dx E11.9    aspirin delayed-release 81 mg tablet Take 1 Tab by mouth daily.      No current facility-administered medications for this visit. Medications Discontinued During This Encounter   Medication Reason    BRILINTA 90 mg tablet Not A Current Medication    losartan (COZAAR) 50 mg tablet REORDER     ~~~~~~~~~~~~~~~~~~~~~~~~~~~~~~~~~~~~~~~~~~~~~~~~~~~~~~~~~~~    Chief Complaint   Patient presents with    Hypertension     Follow up       History provided by patient  History of Present Illness   Darryl Mosqueda is a 74 y.o. male who presents to clinic today for:  Hypertension (Follow up)    Hypertension  Near goal  BP's in 130's at home  Intolerant of statins  Takes meds consistently  Walks about 3 miles per day. No breathing problems or chest pain. Colonoscopy scheduled next month with Dr. Aniceto Blanc. DM2  A1c under 7 on diet only  Trying to watch sugar and has lost a little more weight. Health Maintenance  Colonoscopy scheduled. Health Maintenance Due   Topic Date Due    Shingles Vaccine (1 of 2) Never done    Colorectal Cancer Screening Combo  07/06/2021    COVID-19 Vaccine (4 - Booster for Pfizer series) 11/24/2021    Flu Vaccine (1) 08/01/2022     Review of Systems   Review of Systems   Respiratory:  Negative for shortness of breath. Cardiovascular:  Negative for chest pain and leg swelling. Psychiatric/Behavioral: Negative. Vitals/Objective:     Vitals:    02/09/23 1002 02/09/23 1006   BP: (!) 145/76 (!) 142/76   Pulse: 86    Resp: 20    Temp: 97.5 °F (36.4 °C)    TempSrc: Temporal    SpO2: 99%    Weight: 188 lb 3.2 oz (85.4 kg)    Height: 5' 7\" (1.702 m)      Body mass index is 29.48 kg/m². Wt Readings from Last 3 Encounters:   02/09/23 188 lb 3.2 oz (85.4 kg)   01/30/23 190 lb 9.6 oz (86.5 kg)   12/08/22 183 lb (83 kg)         Objective  Physical Exam  Vitals and nursing note reviewed. Constitutional:       Appearance: Normal appearance. He is not toxic-appearing. HENT:      Head: Normocephalic and atraumatic.    Cardiovascular:      Rate and Rhythm: Normal rate and regular rhythm. Heart sounds: Normal heart sounds. No murmur heard. No gallop. Pulmonary:      Effort: Pulmonary effort is normal. No respiratory distress. Breath sounds: Normal breath sounds. No wheezing, rhonchi or rales. Musculoskeletal:      Cervical back: No muscular tenderness. Lymphadenopathy:      Cervical: No cervical adenopathy. Neurological:      Mental Status: He is alert. Psychiatric:         Mood and Affect: Mood normal.         Behavior: Behavior normal.         Thought Content: Thought content normal.         Judgment: Judgment normal.         No results found for this or any previous visit (from the past 24 hour(s)). Disposition     Follow-up and Dispositions    Return in about 3 months (around 5/9/2023) for Medication follow up, Diabetes follow up, Blood pressure follow up. No future appointments. History   Patient's past medical, surgical and family histories were reviewed and updated.     Past Medical History:   Diagnosis Date    CAD (coronary artery disease)     hypercholesterolemia    Hypercholesterolemia     difficulty tolerating statins    Hypertension     Myocardial infarct ([facility] [address] 08/25/2019    Stents in 1996 and 2019, Dr. Aime Krishna      Past Surgical History:   Procedure Laterality Date    HX CATARACT REMOVAL Bilateral 10/2022    HX COLONOSCOPY  07/2011    normal, Dr. Harrington Goodell, repeat 2011    HX HEENT      wisdom teeth extraction    HX HERNIA REPAIR      inguinal hernia    HX HERNIA REPAIR      bilateral inguinal    HX ORTHOPAEDIC      2nd L toe joint removal    HX ORTHOPAEDIC Right 12/2017    right thumb     [place] UNLISTED PROCEDURE CARDIAC SURGERY  11/1996    stent placed x2, 3 more 8/2019, Dr. Aime Krishna     Family History   Problem Relation Age of Onset    Hypertension Mother     Cancer Father         prostate    Diabetes Father     Heart Disease Father     Hypertension Father     Alzheimer's Disease Father     Heart Disease Sister     Thyroid Disease Sister Cancer Brother         prostate cancerr    Hypertension Brother     No Known Problems Daughter     No Known Problems Son      Social History     Tobacco Use    Smoking status: Former     Packs/day: 0.25     Years: 35.00     Pack years: 8.75     Types: Cigarettes     Quit date: 12/19/2012     Years since quitting: 10.1    Smokeless tobacco: Never   Vaping Use    Vaping Use: Never used   Substance Use Topics    Alcohol use: Yes     Comment: 1/month    Drug use: Never       Allergies     Allergies   Allergen Reactions    Atorvastatin Myalgia     forgetfullness  Other reaction(s): Myalgia  forgetfullness    Simvastatin Other (comments)     forgetfullness    Other reaction(s):  Other (comments)  forgetfullness    Sulfa (Sulfonamide Antibiotics) Unknown (comments)     Patient is unaware    Unable To Obtain Unable to Obtain     Other reaction(s): Unknown (comments)  Other reaction(s): Unknown (comments)  Patient is unaware

## 2023-03-16 RX ORDER — ACETAMINOPHEN 500 MG
500 TABLET ORAL DAILY
COMMUNITY

## 2023-03-16 RX ORDER — MELATONIN
1000 DAILY
COMMUNITY

## 2023-03-16 NOTE — PROGRESS NOTES
[address]  Endoscopy Preprocedure Instructions      1. On the day of your surgery, please report to registration located on the 2nd floor of the  [facility]. yes    2. You must have a responsible adult to drive you to the hospital, stay at the hospital during your procedure and drive you home. If they leave your procedure will not be started (no exceptions). yes    3. Do not have anything to eat or drink (including water, gum, mints, coffee, and juice) after midnight. This does not apply to the medications you were instructed to take by your physician. yes  If you are currently taking Plavix, Coumadin, Aspirin, or other blood-thinning agents, contact your physician for special instructions. yes,aspirin    4. If you are having a procedure that requires bowel prep: We recommend that you have only clear liquids the day before your procedure and begin your bowel prep by 5:00 pm.  You may continue to drink clear liquids until midnight. If for any reason you are not able to complete your prep please notify your physician immediately. yes    5. Have a list of all current medications, including vitamins, herbal supplements and any other over the counter medications. Reviewed over the phone    6. If you wear glasses, contacts, dentures and/or hearing aids, they may be removed prior to procedure, please bring a case to store them in. yes    7. You should understand that if you do not follow these instructions your procedure may be cancelled. If your physical condition changes (I.e. fever, cold or flu) please contact your doctor as soon as possible. 8. It is important that you be on time. If for any reason you are unable to keep your appointment please call [phone] the day of or your physicians office prior to your scheduled procedure    9.  Have you received your COVID Vaccine? yes If no, you will need to receive a COVID test/swab here at [facility] the [facility] parking lot Monday - Friday 8a - 11am. There are no Saturday or Sunday swabbing at any [facility]. (patient verbalizes understanding) not applicable

## 2023-03-17 ENCOUNTER — ANESTHESIA EVENT (OUTPATIENT)
Dept: ENDOSCOPY | Age: 73
End: 2023-03-17
Payer: MEDICARE

## 2023-03-17 ENCOUNTER — HOSPITAL ENCOUNTER (OUTPATIENT)
Age: 73
Setting detail: OUTPATIENT SURGERY
Discharge: HOME | End: 2023-03-17
Attending: SPECIALIST | Admitting: SPECIALIST
Payer: MEDICARE

## 2023-03-17 ENCOUNTER — ANESTHESIA (OUTPATIENT)
Dept: ENDOSCOPY | Age: 73
End: 2023-03-17
Payer: MEDICARE

## 2023-03-17 VITALS
DIASTOLIC BLOOD PRESSURE: 68 MMHG | BODY MASS INDEX: 29.27 KG/M2 | RESPIRATION RATE: 21 BRPM | HEART RATE: 63 BPM | TEMPERATURE: 97.7 F | SYSTOLIC BLOOD PRESSURE: 109 MMHG | OXYGEN SATURATION: 98 % | HEIGHT: 67 IN | WEIGHT: 186.51 LBS

## 2023-03-17 PROCEDURE — 74011250636 HC RX REV CODE- 250/636: Performed by: NURSE ANESTHETIST, CERTIFIED REGISTERED

## 2023-03-17 PROCEDURE — 74011000250 HC RX REV CODE- 250: Performed by: NURSE ANESTHETIST, CERTIFIED REGISTERED

## 2023-03-17 PROCEDURE — 2709999900 HC NON-CHARGEABLE SUPPLY: Performed by: SPECIALIST

## 2023-03-17 PROCEDURE — 76060000031 HC ANESTHESIA FIRST 0.5 HR: Performed by: SPECIALIST

## 2023-03-17 PROCEDURE — 76040000019: Performed by: SPECIALIST

## 2023-03-17 RX ORDER — MIDAZOLAM HYDROCHLORIDE 1 MG/ML
.25-5 INJECTION, SOLUTION INTRAMUSCULAR; INTRAVENOUS AS NEEDED
Status: DISCONTINUED | OUTPATIENT
Start: 2023-03-17 | End: 2023-03-17 | Stop reason: HOSPADM

## 2023-03-17 RX ORDER — PROPOFOL 10 MG/ML
INJECTION, EMULSION INTRAVENOUS AS NEEDED
Status: DISCONTINUED | OUTPATIENT
Start: 2023-03-17 | End: 2023-03-17 | Stop reason: HOSPADM

## 2023-03-17 RX ORDER — GLYCOPYRROLATE 0.2 MG/ML
INJECTION INTRAMUSCULAR; INTRAVENOUS AS NEEDED
Status: DISCONTINUED | OUTPATIENT
Start: 2023-03-17 | End: 2023-03-17 | Stop reason: HOSPADM

## 2023-03-17 RX ORDER — DEXTROMETHORPHAN/PSEUDOEPHED 2.5-7.5/.8
1.2 DROPS ORAL
Status: DISCONTINUED | OUTPATIENT
Start: 2023-03-17 | End: 2023-03-17 | Stop reason: HOSPADM

## 2023-03-17 RX ORDER — FENTANYL CITRATE 50 UG/ML
25 INJECTION, SOLUTION INTRAMUSCULAR; INTRAVENOUS AS NEEDED
Status: DISCONTINUED | OUTPATIENT
Start: 2023-03-17 | End: 2023-03-17 | Stop reason: HOSPADM

## 2023-03-17 RX ORDER — NALOXONE HYDROCHLORIDE 0.4 MG/ML
0.4 INJECTION, SOLUTION INTRAMUSCULAR; INTRAVENOUS; SUBCUTANEOUS
Status: DISCONTINUED | OUTPATIENT
Start: 2023-03-17 | End: 2023-03-17 | Stop reason: HOSPADM

## 2023-03-17 RX ORDER — SODIUM CHLORIDE 9 MG/ML
INJECTION, SOLUTION INTRAVENOUS
Status: DISCONTINUED | OUTPATIENT
Start: 2023-03-17 | End: 2023-03-17 | Stop reason: HOSPADM

## 2023-03-17 RX ORDER — PROPOFOL 10 MG/ML
INJECTION, EMULSION INTRAVENOUS
Status: DISCONTINUED | OUTPATIENT
Start: 2023-03-17 | End: 2023-03-17 | Stop reason: HOSPADM

## 2023-03-17 RX ORDER — FLUMAZENIL 0.1 MG/ML
0.2 INJECTION INTRAVENOUS
Status: DISCONTINUED | OUTPATIENT
Start: 2023-03-17 | End: 2023-03-17 | Stop reason: HOSPADM

## 2023-03-17 RX ORDER — SODIUM CHLORIDE 9 MG/ML
50 INJECTION, SOLUTION INTRAVENOUS CONTINUOUS
Status: DISCONTINUED | OUTPATIENT
Start: 2023-03-17 | End: 2023-03-17 | Stop reason: HOSPADM

## 2023-03-17 RX ADMIN — SODIUM CHLORIDE: 9 INJECTION, SOLUTION INTRAVENOUS at 07:45

## 2023-03-17 RX ADMIN — PROPOFOL 200 MCG/KG/MIN: 10 INJECTION, EMULSION INTRAVENOUS at 08:05

## 2023-03-17 RX ADMIN — PROPOFOL 50 MG: 10 INJECTION, EMULSION INTRAVENOUS at 08:05

## 2023-03-17 RX ADMIN — GLYCOPYRROLATE 0.2 MG: 0.2 INJECTION INTRAMUSCULAR; INTRAVENOUS at 08:08

## 2023-03-17 NOTE — H&P
74 y.o. male for open access colonoscopy for screening   Additional data for completion of the targeted pre-endoscopy H&P will be provided under 'H&P interval notes'. Please see that document which will be attached to this.   Lieutenant Luda MD  Last 10 years ago Estefany Mariee normal, but now there is report of family history of CRC that was not apparently noted at time of last exam.
No

## 2023-03-17 NOTE — ANESTHESIA PREPROCEDURE EVALUATION
Relevant Problems   No relevant active problems       Anesthetic History               Review of Systems / Medical History  Patient summary reviewed, nursing notes reviewed and pertinent labs reviewed    Pulmonary  Within defined limits                 Neuro/Psych              Cardiovascular    Hypertension: well controlled          CAD    Exercise tolerance: >4 METS  Comments: H/o MI @ 5 stents in various times. No CP or SOB, no current issues  Goes multiple flight of stairs at a time without any problem. Walks miles as needed.    GI/Hepatic/Renal                Endo/Other    Diabetes: well controlled, type 2    Arthritis     Other Findings   Comments: Diet controlled DM           Physical Exam    Airway  Mallampati: II  TM Distance: > 6 cm  Neck ROM: normal range of motion   Mouth opening: Normal     Cardiovascular    Rhythm: regular  Rate: normal         Dental  No notable dental hx       Pulmonary  Breath sounds clear to auscultation               Abdominal  GI exam deferred       Other Findings            Anesthetic Plan    ASA: 2  Anesthesia type: MAC          Induction: Intravenous  Anesthetic plan and risks discussed with: Patient

## 2023-03-17 NOTE — PROGRESS NOTES
Dejah Renee  1950  [id number]    Situation:  Verbal report received from: Vera Hernandez RN   Procedure: Procedure(s):  COLONOSCOPY    Background:    Preoperative diagnosis: FX HX OF MAKIGNANT NEOPLASM OF GASTROINTESTINAL TRACT, SCREENING FOR MALIGNANT NEOPLASMS OF COLON  Postoperative diagnosis: Diverticulosis    :  Dr. Silvio Ariza   Assistant(s): Endoscopy Technician-1: Nikole Fletcher  Endoscopy RN-1: Jorge Luis Suarez RN    Specimens: * No specimens in log *  H. Pylori  no    Assessment:  Intra-procedure medications     Anesthesia gave intra-procedure sedation and medications, see anesthesia flow sheet yes    Intravenous fluids: NS@ KVO     Vital signs stable   yes     Abdominal assessment: round and soft   yes    Recommendation:  Discharge patient per MD order  yes.   Return to floor  outpatient  Family or Friend   spouse  Permission to share finding with family or friend yes

## 2023-03-17 NOTE — DISCHARGE INSTRUCTIONS
[address] [facility] LEXX Denny MD  [phone]      March 17, 2023    Malik Head  YOB: 1950    COLONOSCOPY DISCHARGE INSTRUCTIONS    If there is redness at IV site you should apply warm compress to area. If redness or soreness persist contact Dr. Isacc Denny' or your primary care doctor. There may be a slight amount of blood passed from the rectum. Gaseous discomfort may develop, but walking, belching will help relieve this. You may not operate a vehicle for 12 hours  You may not operate machinery or dangerous appliances for rest of today  You may not drink alcoholic beverages for 12 hours  Avoid making any critical decisions for 24 hours    DIET:  You may resume your normal diet, but some patients find that heavy or large meals may lead to indigestion or vomiting. I suggest a light meal as first food intake. MEDICATIONS:  The use of some over-the-counter pain medication may lead to bleeding after colon biopsies or polyp removal.  Tylenol (also called acetaminophen) is safe to take even if you have had colonoscopy with polyp removal.  Based on the procedure you had today you may safely take aspirin or aspirin-like products for the next ten (10) days. Remember that Tylenol (also called acetaminophen) is safe to take after colonoscopy even if you have had biopsies or polyps removed. ACTIVITY:  You may resume your normal household activities, but it is recommended that you spend the remainder of the day resting -  avoid any strenuous activity. CALL DR. Enrike Dick' OFFICE IF:  Increasing pain, nausea, vomiting  Abdominal distension (swelling)  Significant new or increased bleeding (oral or rectal)  Fever/Chills  Chest pain/shortness of breath                       Additional instructions:   Great news, no polyps and no colon cancer.   You would only need to do colonoscopy again if you were to develop problems such as bleeding or anemia. It was an honor to be your doctor today. Please let me or my office staff know if you have any feedback about today's procedure. Hardik Banerjee MD    Colonoscopy saves lives, and can prevent colon cancer. Everyone aged 48 or older needs colonoscopy.   Tell your family and friends: get the test!

## 2023-03-17 NOTE — PROGRESS NOTES
Endoscopy discharge instructions have been reviewed and given to patient. The patient verbalized understanding and acceptance of instructions. Dr. Anurag Russell  discussed with spouse procedure findings and next steps.

## 2023-03-17 NOTE — INTERVAL H&P NOTE
Pre-Endoscopy H&P Update  Chief complaint/HPI/ROS:  The indication for the procedure, the patient's history and the patient's current medications are reviewed prior to the procedure and that data is reported on the H&P to which this document is attached. Any significant complaints with regard to organ systems will be noted, and if not mentioned then a review of systems is not contributory.   Past Medical History:   Diagnosis Date    Arthritis     CAD (coronary artery disease)     hypercholesterolemia    Hypercholesterolemia     difficulty tolerating statins    Hypertension     Ill-defined condition     pre-diabetes    Myocardial infarct ([facility] [address] 08/25/2019    Stents in 1996 and 2019, Dr. Renay Gillette       Past Surgical History:   Procedure Laterality Date    HX CATARACT REMOVAL Bilateral 10/2022    HX COLONOSCOPY  07/2011    normal, Dr. Yesi Hassan, repeat 2011    HX HEENT      wisdom teeth extraction    HX HERNIA REPAIR      inguinal hernia    HX HERNIA REPAIR      bilateral inguinal    HX ORTHOPAEDIC      2nd L toe joint removal    HX ORTHOPAEDIC Right 12/2017    right thumb d/t arthritis    AK UNLISTED PROCEDURE CARDIAC SURGERY  11/1996    stent placed x2, 3 more 8/2019, Dr. Danie aCrl History     Tobacco Use    Smoking status: Former     Packs/day: 0.25     Years: 35.00     Pack years: 8.75     Types: Cigarettes     Quit date: 12/19/2012     Years since quitting: 10.2    Smokeless tobacco: Never   Substance Use Topics    Alcohol use: Not Currently     Comment: 1/month/every rare      Family History   Problem Relation Age of Onset    Stroke Mother     Cancer Father         prostate    Diabetes Father     Heart Disease Father     Hypertension Father     Alzheimer's Disease Father     Heart Disease Sister     Thyroid Disease Sister     Cancer Brother         prostate cancerr    Hypertension Brother     No Known Problems Daughter     No Known Problems Son       Allergies   Allergen Reactions    Atorvastatin Myalgia     forgetfullness  Other reaction(s): Myalgia  forgetfullness    Nsaids (Non-Steroidal Anti-Inflammatory Drug) Other (comments)     \"Brain fog and forgetfullness\"    Simvastatin Other (comments)     forgetfullness    Other reaction(s): Other (comments)  forgetfullness    Sulfa (Sulfonamide Antibiotics) Unknown (comments)     Patient is unaware    Unable To Obtain Unable to Obtain     Other reaction(s): Unknown (comments)  Other reaction(s): Unknown (comments)  Patient is unaware      Prior to Admission Medications   Prescriptions Last Dose Informant Patient Reported? Taking? Blood-Glucose Meter (FREESTYLE FREEDOM LITE) monitoring kit Unknown  No No   Sig: For blood sugar testing twice daily. Dx E11.9   Lancets (FREESTYLE LANCETS) misc Unknown  No No   Sig: For blood sugar testing twice daily. Dx E11.9   acetaminophen (TYLENOL) 500 mg tablet 3/16/2023  Yes Yes   Sig: Take 500 mg by mouth daily. aspirin delayed-release 81 mg tablet 3/16/2023  Yes Yes   Sig: Take 1 Tab by mouth daily. cholecalciferol (VITAMIN D3) (1000 Units /25 mcg) tablet 3/16/2023  Yes Yes   Sig: Take 1,000 Units by mouth daily. flaxseed oil 1,000 mg cap 3/16/2023 Self Yes Yes   Sig: Take 1,000 mg by mouth two (2) times a day. glucose blood VI test strips (FREESTYLE LITE STRIPS) strip Unknown  No No   Sig: Test fasting blood sugar twice daily. Dx E11.9   losartan (COZAAR) 50 mg tablet 3/16/2023  No Yes   Sig: Take 1 Tablet by mouth daily. Facility-Administered Medications: None       PHYSICAL EXAM:  The patient is examined immediately prior to the procedure. Visit Vitals  BP (!) 149/87   Pulse (!) 48   Temp 98 °F (36.7 °C)   Resp 16   Ht 5' 7\" (1.702 m)   Wt 84.6 kg (186 lb 8.2 oz)   SpO2 98%   BMI 29.21 kg/m²     Gen: Appears comfortable, no distress. Pulm: comfortable respirations with no abnormal audible breath sounds  HEART: well perfused, no abnormal audible heart sounds  GI: abdomen flat.     PLAN:  Informed consent discussion held, patient afforded an opportunity to ask questions and all questions answered. After being advised of the risks, benefits, and alternatives, the patient requested that we proceed and indicated so on a written consent form. Will proceed with procedure as planned.   Cristhian Julian MD

## 2023-03-17 NOTE — ANESTHESIA POSTPROCEDURE EVALUATION
Procedure(s):  COLONOSCOPY. MAC    Anesthesia Post Evaluation      Multimodal analgesia: multimodal analgesia not used between 6 hours prior to anesthesia start to PACU discharge  Patient location during evaluation: bedside  Patient participation: complete - patient participated  Level of consciousness: awake and alert and responsive to verbal stimuli  Pain score: 0  Pain management: adequate  Airway patency: patent  Anesthetic complications: no  Cardiovascular status: acceptable and hemodynamically stable  Respiratory status: acceptable and spontaneous ventilation  Hydration status: acceptable  Post anesthesia nausea and vomiting:  none  Final Post Anesthesia Temperature Assessment:  Normothermia (36.0-37.5 degrees C)      INITIAL Post-op Vital signs:   Vitals Value Taken Time   /55 03/17/23 0835   Temp 36.5 °C (97.7 °F) 03/17/23 0825   Pulse 64 03/17/23 0837   Resp 16 03/17/23 0837   SpO2 100 % 03/17/23 0837   Vitals shown include unvalidated device data. SUBJECTIVE:     Minh Batres is a 8 month old male, here for a routine health maintenance visit.    Patient was roomed by: Karina De La Torre MA    Well Child     Social History  Patient accompanied by:  Mother  Questions or concerns?: YES (bump on back of neck )    Forms to complete? No  Child lives with::  Mother and brother  Who takes care of your child?:  Father and mother  Languages spoken in the home:  English  Recent family changes/ special stressors?:  OTHER*    Safety / Health Risk  Is your child around anyone who smokes?  YES; passive exposure from smoking outside home    TB Exposure:     No TB exposure    Car seat < 6 years old, in  back seat, rear-facing, 5-point restraint? Yes    Home Safety Survey:      Stairs Gated?:  Not Applicable     Wood stove / Fireplace screened?  Not applicable     Poisons / cleaning supplies out of reach?:  Yes     Swimming pool?:  Not Applicable     Firearms in the home?: No      Hearing / Vision  Hearing or vision concerns?  No concerns, hearing and vision subjectively normal    Daily Activities    Water source:  Bottled water  Nutrition:  Breastmilk, pureed foods and finger feeding  Breastfeeding concerns?  None, breastfeeding going well; no concerns  Vitamins & Supplements:  Yes      Vitamin type: D only    Elimination       Urinary frequency:4-6 times per 24 hours     Stool frequency: once per 24 hours     Stool consistency: soft     Elimination problems:  None    Sleep      Sleep arrangement:co-sleeping with parent    Sleep position:  On back and on side    Sleep pattern: wakes at night for feedings, feeding to sleep and naps (add details)      [place]  Depression Scale (EPDS) Risk Assessment: Completed - Follow up as indicated:  Mom getting therapy now and is on an SSRI      Dental visit recommended: No  Dental Varnish Application    Contraindications: None    Dental Fluoride applied to teeth by: MA/LPN/RN    Next treatment due in:  Next preventive  "care visit    DEVELOPMENT  Screening tool used, reviewed with parent/guardian: No screening tool used  Milestones (by observation/ exam/ report) 75-90% ile  PERSONAL/ SOCIAL/COGNITIVE:    Turns from strangers    Reaches for familiar people    Looks for objects when out of sight  LANGUAGE:    Laughs/ Squeals    Turns to voice/ name    Babbles  GROSS MOTOR:    Rolling    Pull to sit-no head lag    Sit with support  FINE MOTOR/ ADAPTIVE:    Puts objects in mouth    Raking grasp    Transfers hand to hand    PROBLEM LIST  Patient Active Problem List   Diagnosis     Normal  (single liveborn)     MEDICATIONS  No current outpatient medications on file.      ALLERGY  No Known Allergies    IMMUNIZATIONS  Immunization History   Administered Date(s) Administered     DTAP-IPV/HIB (PENTACEL) 2019, 2020     Hep B, Peds or Adolescent 2019, 2019     Pneumo Conj 13-V (2010&after) 2019, 2020     Rotavirus, monovalent, 2-dose 2019, 2020       HEALTH HISTORY SINCE LAST VISIT  No surgery, major illness or injury since last physical exam  Noted a small nodule on left posterior occiput    ROS  Constitutional, eye, ENT, skin, respiratory, cardiac, GI, MSK, neuro, and allergy are normal except as otherwise noted.    OBJECTIVE:   EXAM  Temp 98.4  F (36.9  C) (Rectal)   Ht 2' 5.53\" (0.75 m)   Wt 21 lb 5 oz (9.667 kg)   HC 18.7\" (47.5 cm)   BMI 17.19 kg/m    98 %ile (Z= 2.05) based on WHO (Boys, 0-2 years) head circumference-for-age based on Head Circumference recorded on 2020.  79 %ile (Z= 0.81) based on WHO (Boys, 0-2 years) weight-for-age data using vitals from 2020.  93 %ile (Z= 1.45) based on WHO (Boys, 0-2 years) Length-for-age data based on Length recorded on 2020.  58 %ile (Z= 0.21) based on WHO (Boys, 0-2 years) weight-for-recumbent length data based on body measurements available as of 2020.  GENERAL: Active, alert, in no acute distress.  SKIN: small BB sized " nodule on right occiput, slightly mobile  HEAD: Normocephalic. Normal fontanels and sutures.  EYES: Conjunctivae and cornea normal. Red reflexes present bilaterally.  EARS: Normal canals. Tympanic membranes are normal; gray and translucent.  NOSE: Normal without discharge.  MOUTH/THROAT: Clear. No oral lesions.  NECK: Supple, no masses.  LYMPH NODES: No adenopathy  LUNGS: Clear. No rales, rhonchi, wheezing or retractions  HEART: Regular rhythm. Normal S1/S2. No murmurs. Normal femoral pulses.  ABDOMEN: Soft, non-tender, not distended, no masses or hepatosplenomegaly. Normal umbilicus and bowel sounds.   GENITALIA: Normal male external genitalia. Kristofer stage I,  Testes descended bilateraly, no hernia or hydrocele.    EXTREMITIES: Hips normal with negative Ortolani and Lynch. Symmetric creases and  no deformities  NEUROLOGIC: Normal tone throughout. Normal reflexes for age    ASSESSMENT/PLAN:   1. Encounter for routine child health examination w/o abnormal findings  Doing well.   - MATERNAL HEALTH RISK ASSESSMENT (91085)- EPDS  - Screening Questionnaire for Immunizations  - DTAP - HIB - IPV VACCINE, IM USE (Pentacel) [32048]  - HEPATITIS B VACCINE,PED/ADOL,IM [07603]  - PNEUMOCOCCAL CONJ VACCINE 13 VALENT IM [29212]  - VACCINE ADMINISTRATION, INITIAL  - VACCINE ADMINISTRATION, EACH ADDITIONAL  - APPLICATION TOPICAL FLUORIDE VARNISH (Dental Varnish)    2. Palpable lymph node  Likely small lymph node on left occiput;  I expect this to resolve in time.        Anticipatory Guidance  Reviewed Anticipatory Guidance in patient instructions    Preventive Care Plan   Immunizations     See orders in Health system.  I reviewed the signs and symptoms of adverse effects and when to seek medical care if they should arise.  Referrals/Ongoing Specialty care: No   See other orders in Health system    Resources:  [place] Child and Teen Checkups (C&TC) Schedule of Age-Related Screening Standards    FOLLOW-UP:    12 month Preventive Care  visit    Orlando Burt MD  [facility]

## 2023-03-17 NOTE — PERIOP NOTES
5612 Patient has been evaluated by anesthesia pre-procedure. Patient alert and oriented. Vital signs will not be charted by the Endoscopy nurse. All vitals, airway, and loc are monitored by anesthesia staff Malou Gilliland CRNA throughout procedure. 0820 Endoscope was pre-cleaned at bedside immediately following procedure by Lisa szymanski. 7992  Patient tolerated procedure. Abdomen soft and patient arousable and voices no complaints. Patient transported to endoscopy recovery area. Report given to post procedure ADELIA Alfred.

## 2023-03-17 NOTE — PROCEDURES
[address] [facility] LEXX Gan MD  [phone]      2023    Colonoscopy Procedure Note  Kiarra Pride  :  1950  Jennifer Medical Record Number: [id number]    Indications:     Screening colonoscopy  PCP:  Berna Stephenson MD  Anesthesia/Sedation: Conscious Sedation/Moderate Sedation/GETA, see notes  Endoscopist:  Dr. Andrew Solo  Complications:  None  Estimated Blood Loss:  None    Permit:  The indications, risks, benefits and alternatives were reviewed with the patient or their decision maker who was provided an opportunity to ask questions and all questions were answered. The specific risks of colonoscopy with conscious sedation were reviewed, including but not limited to anesthetic complication, bleeding, adverse drug reaction, missed lesion, infection, IV site reactions, and intestinal perforation which would lead to the need for surgical repair. Alternatives to colonoscopy including radiographic imaging, observation without testing, or laboratory testing were reviewed including the limitations of those alternatives. After considering the options and having all their questions answered, the patient or their decision maker provided both verbal and written consent to proceed. Procedure in Detail:  After obtaining informed consent, positioning of the patient in the left lateral decubitus position, and conduction of a pre-procedure pause or \"time out\" the endoscope was introduced into the anus and advanced to the cecum, which was identified by the ileocecal valve and appendiceal orifice. The quality of the colonic preparation was good. A careful inspection was made as the colonoscope was withdrawn, findings and interventions are described below. Findings:    There is diverticulosis in the sigmoid colon without complications such as bleeding, inflammatory change, or luminal narrowing. Specimens:    none    Complications:   None; patient tolerated the procedure well. Impression:  Otherwise normal colonoscopy to the cecum    Recommendations:     - Follow up with primary care physician. - Further colonoscopy only if signs/symptoms of colonic disease develop. Thank you for entrusting me with this patient's care. Please do not hesitate to contact me with any questions or if I can be of assistance with any of your other patients' GI needs. Signed By: Chanelle Gatica MD                        March 17, 2023      Surgical assistant none. Implants none unless specified.

## 2023-04-27 ENCOUNTER — TRANSCRIBE ORDER (OUTPATIENT)
Dept: SCHEDULING | Age: 73
End: 2023-04-27

## 2023-04-27 DIAGNOSIS — M81.0 OSTEOPOROSIS: Primary | ICD-10-CM

## 2023-04-27 DIAGNOSIS — E04.1 THYROID NODULE: Primary | ICD-10-CM

## 2023-04-28 ENCOUNTER — TELEPHONE (OUTPATIENT)
Dept: FAMILY MEDICINE CLINIC | Age: 73
End: 2023-04-28

## 2023-04-28 DIAGNOSIS — I10 ESSENTIAL HYPERTENSION: Primary | ICD-10-CM

## 2023-05-02 DIAGNOSIS — I10 ESSENTIAL HYPERTENSION: ICD-10-CM

## 2023-05-02 RX ORDER — LOSARTAN POTASSIUM 50 MG/1
50 TABLET ORAL DAILY
Qty: 90 TABLET | Refills: 1 | Status: SHIPPED | OUTPATIENT
Start: 2023-05-02 | End: 2023-05-03 | Stop reason: SDUPTHER

## 2023-05-03 RX ORDER — LOSARTAN POTASSIUM 50 MG/1
50 TABLET ORAL DAILY
Qty: 90 TABLET | Refills: 1 | Status: SHIPPED | OUTPATIENT
Start: 2023-05-03 | End: 2023-05-04 | Stop reason: SDUPTHER

## 2023-05-04 DIAGNOSIS — I10 ESSENTIAL HYPERTENSION: Primary | ICD-10-CM

## 2023-05-04 RX ORDER — LOSARTAN POTASSIUM 50 MG/1
50 TABLET ORAL DAILY
Qty: 90 TABLET | Refills: 1 | Status: SHIPPED | OUTPATIENT
Start: 2023-05-04

## 2023-05-08 DIAGNOSIS — E04.1 THYROID NODULE: Primary | ICD-10-CM

## 2023-05-09 ENCOUNTER — TRANSCRIBE ORDERS (OUTPATIENT)
Facility: HOSPITAL | Age: 73
End: 2023-05-09

## 2023-05-09 DIAGNOSIS — E04.1 THYROID NODULE: ICD-10-CM

## 2023-05-09 DIAGNOSIS — M81.0 OSTEOPOROSIS, UNSPECIFIED OSTEOPOROSIS TYPE, UNSPECIFIED PATHOLOGICAL FRACTURE PRESENCE: Primary | ICD-10-CM

## 2023-05-16 ENCOUNTER — NURSE TRIAGE (OUTPATIENT)
Dept: OTHER | Facility: CLINIC | Age: 73
End: 2023-05-16

## 2023-05-16 NOTE — TELEPHONE ENCOUNTER
Location of patient: [address] deg[place] from [facility] at [facility] with [employer]. Subjective: Caller states \"I have had some significant feeling of fatigue. I had diarrhea a few days ago, likely d/t too much seafood. I just feel so fatigued and this isn't like me. I'm usually out doing something all day every day. \"     Onset:  about one week    Pain Severity:  no pain      Temperature:  no fever    What has been tried: nothing so far    Recommended disposition: See PCP within 3 Days    Care advice provided, patient verbalizes understanding; denies any other questions or concerns; instructed to call back for any new or worsening symptoms. Yoan Rousseau, Service Expert, is working to get an appt for pt. Attention Provider: Thank you for allowing me to participate in the care of your patient. The patient was connected to triage in response to information provided to the ECC/PSC. Please do not respond through this encounter as the response is not directed to a shared pool.         Reason for Disposition   [1] Fatigue (i.e., tires easily, decreased energy) AND [2] persists > 1 week    Protocols used: Weakness (Generalized) and Fatigue-ADULT-

## 2023-05-17 ENCOUNTER — OFFICE VISIT (OUTPATIENT)
Facility: CLINIC | Age: 73
End: 2023-05-17
Payer: MEDICARE

## 2023-05-17 VITALS
TEMPERATURE: 98 F | HEART RATE: 84 BPM | WEIGHT: 184 LBS | DIASTOLIC BLOOD PRESSURE: 75 MMHG | SYSTOLIC BLOOD PRESSURE: 120 MMHG | HEIGHT: 67 IN | BODY MASS INDEX: 28.88 KG/M2 | RESPIRATION RATE: 16 BRPM | OXYGEN SATURATION: 96 %

## 2023-05-17 DIAGNOSIS — E55.9 VITAMIN D DEFICIENCY: ICD-10-CM

## 2023-05-17 DIAGNOSIS — E11.9 TYPE 2 DIABETES MELLITUS WITHOUT COMPLICATION, WITHOUT LONG-TERM CURRENT USE OF INSULIN (HCC): Primary | ICD-10-CM

## 2023-05-17 DIAGNOSIS — R53.83 OTHER FATIGUE: ICD-10-CM

## 2023-05-17 PROCEDURE — 3017F COLORECTAL CA SCREEN DOC REV: CPT | Performed by: NURSE PRACTITIONER

## 2023-05-17 PROCEDURE — G8427 DOCREV CUR MEDS BY ELIG CLIN: HCPCS | Performed by: NURSE PRACTITIONER

## 2023-05-17 PROCEDURE — 4004F PT TOBACCO SCREEN RCVD TLK: CPT | Performed by: NURSE PRACTITIONER

## 2023-05-17 PROCEDURE — 2022F DILAT RTA XM EVC RTNOPTHY: CPT | Performed by: NURSE PRACTITIONER

## 2023-05-17 PROCEDURE — 3074F SYST BP LT 130 MM HG: CPT | Performed by: NURSE PRACTITIONER

## 2023-05-17 PROCEDURE — 3078F DIAST BP <80 MM HG: CPT | Performed by: NURSE PRACTITIONER

## 2023-05-17 PROCEDURE — 1123F ACP DISCUSS/DSCN MKR DOCD: CPT | Performed by: NURSE PRACTITIONER

## 2023-05-17 PROCEDURE — 99214 OFFICE O/P EST MOD 30 MIN: CPT | Performed by: NURSE PRACTITIONER

## 2023-05-17 PROCEDURE — G8419 CALC BMI OUT NRM PARAM NOF/U: HCPCS | Performed by: NURSE PRACTITIONER

## 2023-05-17 PROCEDURE — 3046F HEMOGLOBIN A1C LEVEL >9.0%: CPT | Performed by: NURSE PRACTITIONER

## 2023-05-17 ASSESSMENT — ENCOUNTER SYMPTOMS
SHORTNESS OF BREATH: 0
COLOR CHANGE: 0

## 2023-05-17 NOTE — PROGRESS NOTES
Chief Complaint   Patient presents with    Fatigue     X 1 wk     BP (!) 144/82   Pulse 84   Temp 98 °F (36.7 °C)   Resp 16   Ht 5' 7\" (1.702 m)   Wt 184 lb (83.5 kg)   SpO2 96%   BMI 28.82 kg/m²   1. Have you been to the ER, urgent care clinic since your last visit? Hospitalized since your last visit? No    2. Have you seen or consulted any other health care providers outside of the [address] [place] [address] since your last visit? Include any pap smears or colon screening.  No
or weakness. ROS:   Review of Systems   Constitutional:  Positive for fatigue. Negative for fever. HENT: Negative. Eyes:  Negative for visual disturbance. Respiratory:  Negative for shortness of breath. Cardiovascular:  Negative for chest pain. Endocrine: Negative for polyphagia. Genitourinary:  Negative for difficulty urinating. Musculoskeletal:  Negative for myalgias. Skin:  Negative for color change. Allergic/Immunologic: Negative for immunocompromised state. Neurological:  Negative for dizziness, weakness and headaches. Psychiatric/Behavioral:  The patient is not nervous/anxious. Objective:   /75   Pulse 84   Temp 98 °F (36.7 °C)   Resp 16   Ht 5' 7\" (1.702 m)   Wt 184 lb (83.5 kg)   SpO2 96%   BMI 28.82 kg/m²       Vitals and Nurse Documentation reviewed. Physical Exam  Vitals and nursing note reviewed. Constitutional:       Appearance: Normal appearance. HENT:      Head: Normocephalic and atraumatic. Nose: Nose normal.      Mouth/Throat:      Mouth: Mucous membranes are moist.   Eyes:      Extraocular Movements: Extraocular movements intact. Conjunctiva/sclera: Conjunctivae normal.      Pupils: Pupils are equal, round, and reactive to light. Cardiovascular:      Rate and Rhythm: Normal rate and regular rhythm. Pulses: Normal pulses. Heart sounds: Normal heart sounds. Pulmonary:      Effort: Pulmonary effort is normal.      Breath sounds: Normal breath sounds. Abdominal:      General: Abdomen is flat. Musculoskeletal:         General: Normal range of motion. Cervical back: Normal range of motion. Skin:     General: Skin is warm. Capillary Refill: Capillary refill takes less than 2 seconds. Neurological:      General: No focal deficit present. Mental Status: He is alert and oriented to person, place, and time.    Psychiatric:         Mood and Affect: Mood normal.         Behavior: Behavior normal.        No

## 2023-05-18 LAB
25(OH)D3 SERPL-MCNC: 21.3 NG/ML (ref 30–100)
ALBUMIN SERPL-MCNC: 3.7 G/DL (ref 3.5–5)
ALBUMIN/GLOB SERPL: 1.1 (ref 1.1–2.2)
ALP SERPL-CCNC: 91 U/L (ref 45–117)
ALT SERPL-CCNC: 32 U/L (ref 12–78)
ANION GAP SERPL CALC-SCNC: 4 MMOL/L (ref 5–15)
APPEARANCE UR: CLEAR
AST SERPL-CCNC: 23 U/L (ref 15–37)
BACTERIA URNS QL MICRO: NEGATIVE /HPF
BASOPHILS # BLD: 0.1 K/UL (ref 0–0.1)
BASOPHILS NFR BLD: 1 % (ref 0–1)
BILIRUB SERPL-MCNC: 0.4 MG/DL (ref 0.2–1)
BILIRUB UR QL: NEGATIVE
BUN SERPL-MCNC: 14 MG/DL (ref 6–20)
BUN/CREAT SERPL: 17 (ref 12–20)
CALCIUM SERPL-MCNC: 9.4 MG/DL (ref 8.5–10.1)
CHLORIDE SERPL-SCNC: 106 MMOL/L (ref 97–108)
CO2 SERPL-SCNC: 27 MMOL/L (ref 21–32)
COLOR UR: NORMAL
CREAT SERPL-MCNC: 0.81 MG/DL (ref 0.7–1.3)
DIFFERENTIAL METHOD BLD: ABNORMAL
EOSINOPHIL # BLD: 0.2 K/UL (ref 0–0.4)
EOSINOPHIL NFR BLD: 3 % (ref 0–7)
EPITH CASTS URNS QL MICRO: NORMAL /LPF
ERYTHROCYTE [DISTWIDTH] IN BLOOD BY AUTOMATED COUNT: 12.7 % (ref 11.5–14.5)
EST. AVERAGE GLUCOSE BLD GHB EST-MCNC: 171 MG/DL
GLOBULIN SER CALC-MCNC: 3.5 G/DL (ref 2–4)
GLUCOSE SERPL-MCNC: 166 MG/DL (ref 65–100)
GLUCOSE UR STRIP.AUTO-MCNC: NEGATIVE MG/DL
HBA1C MFR BLD: 7.6 % (ref 4–5.6)
HCT VFR BLD AUTO: 47.2 % (ref 36.6–50.3)
HGB BLD-MCNC: 14.6 G/DL (ref 12.1–17)
HGB UR QL STRIP: NEGATIVE
HYALINE CASTS URNS QL MICRO: NORMAL /LPF (ref 0–5)
IMM GRANULOCYTES # BLD AUTO: 0 K/UL (ref 0–0.04)
IMM GRANULOCYTES NFR BLD AUTO: 0 % (ref 0–0.5)
KETONES UR QL STRIP.AUTO: NEGATIVE MG/DL
LEUKOCYTE ESTERASE UR QL STRIP.AUTO: NEGATIVE
LYMPHOCYTES # BLD: 2 K/UL (ref 0.8–3.5)
LYMPHOCYTES NFR BLD: 24 % (ref 12–49)
MCH RBC QN AUTO: 31.3 PG (ref 26–34)
MCHC RBC AUTO-ENTMCNC: 30.9 G/DL (ref 30–36.5)
MCV RBC AUTO: 101.1 FL (ref 80–99)
MONOCYTES # BLD: 0.6 K/UL (ref 0–1)
MONOCYTES NFR BLD: 7 % (ref 5–13)
NEUTS SEG # BLD: 5.5 K/UL (ref 1.8–8)
NEUTS SEG NFR BLD: 65 % (ref 32–75)
NITRITE UR QL STRIP.AUTO: NEGATIVE
NRBC # BLD: 0 K/UL (ref 0–0.01)
NRBC BLD-RTO: 0 PER 100 WBC
PH UR STRIP: 5.5 (ref 5–8)
PLATELET # BLD AUTO: 255 K/UL (ref 150–400)
PMV BLD AUTO: 9.9 FL (ref 8.9–12.9)
POTASSIUM SERPL-SCNC: 4.6 MMOL/L (ref 3.5–5.1)
PROT SERPL-MCNC: 7.2 G/DL (ref 6.4–8.2)
PROT UR STRIP-MCNC: NEGATIVE MG/DL
RBC # BLD AUTO: 4.67 M/UL (ref 4.1–5.7)
RBC #/AREA URNS HPF: NORMAL /HPF (ref 0–5)
SODIUM SERPL-SCNC: 137 MMOL/L (ref 136–145)
SP GR UR REFRACTOMETRY: 1.02 (ref 1–1.03)
TSH SERPL DL<=0.05 MIU/L-ACNC: 0.72 UIU/ML (ref 0.36–3.74)
UROBILINOGEN UR QL STRIP.AUTO: 0.2 EU/DL (ref 0.2–1)
WBC # BLD AUTO: 8.4 K/UL (ref 4.1–11.1)
WBC URNS QL MICRO: NORMAL /HPF (ref 0–4)

## 2023-05-19 ENCOUNTER — TELEPHONE (OUTPATIENT)
Facility: CLINIC | Age: 73
End: 2023-05-19

## 2023-05-25 ENCOUNTER — TELEPHONE (OUTPATIENT)
Facility: CLINIC | Age: 73
End: 2023-05-25

## 2023-07-13 ENCOUNTER — TELEPHONE (OUTPATIENT)
Facility: CLINIC | Age: 73
End: 2023-07-13

## 2023-07-13 RX ORDER — NAPROXEN 500 MG/1
500 TABLET ORAL
COMMUNITY
Start: 2016-10-19

## 2023-07-13 RX ORDER — PREDNISOLONE ACETATE 10 MG/ML
SUSPENSION/ DROPS OPHTHALMIC
COMMUNITY
Start: 2022-09-07

## 2023-07-13 RX ORDER — METFORMIN HYDROCHLORIDE 500 MG/1
TABLET, EXTENDED RELEASE ORAL
COMMUNITY
Start: 2023-04-26

## 2023-07-13 RX ORDER — EZETIMIBE 10 MG/1
TABLET ORAL
COMMUNITY
Start: 2023-04-26

## 2023-07-13 RX ORDER — CLOTRIMAZOLE 1 %
CREAM (GRAM) TOPICAL
COMMUNITY
Start: 2017-03-09

## 2023-07-13 NOTE — TELEPHONE ENCOUNTER
PCP: Debbie Hobbs MD    Last appt: 05/17/23  by Denver Bakes, NP    Mr. Skip Sarah is requesting:  Freestyle Freedom lite strips     [facility]          Future Appointments   Date Time Provider [address]   8/11/2023  8:40 AM Debbie Hobbs MD [facility] AMB       Requested Prescriptions      No prescriptions requested or ordered in this encounter       Prior labs and Blood pressures:  BP Readings from Last 3 Encounters:   05/17/23 120/75   02/09/23 134/72   01/30/23 132/68     Lab Results   Component Value Date/Time     05/17/2023 02:41 PM    K 4.6 05/17/2023 02:41 PM     05/17/2023 02:41 PM    CO2 27 05/17/2023 02:41 PM    BUN 14 05/17/2023 02:41 PM    GFRAA >60 03/22/2021 02:01 PM     No results found for: HBA1C, [id number]  Lab Results   Component Value Date/Time    CHOL 174 11/30/2022 09:06 AM    HDL 42 11/30/2022 09:06 AM     No results found for: VITD3, VD3RIA    Lab Results   Component Value Date/Time    TSH 1.29 10/26/2020 08:05 AM

## 2023-07-24 ENCOUNTER — NURSE ONLY (OUTPATIENT)
Facility: CLINIC | Age: 73
End: 2023-07-24

## 2023-07-24 VITALS
BODY MASS INDEX: 28.09 KG/M2 | WEIGHT: 179 LBS | HEIGHT: 67 IN | SYSTOLIC BLOOD PRESSURE: 129 MMHG | DIASTOLIC BLOOD PRESSURE: 69 MMHG | HEART RATE: 60 BPM | RESPIRATION RATE: 20 BRPM | OXYGEN SATURATION: 96 % | TEMPERATURE: 97.4 F

## 2023-07-24 DIAGNOSIS — I10 HYPERTENSION, UNSPECIFIED TYPE: Primary | ICD-10-CM

## 2023-07-24 SDOH — ECONOMIC STABILITY: INCOME INSECURITY: HOW HARD IS IT FOR YOU TO PAY FOR THE VERY BASICS LIKE FOOD, HOUSING, MEDICAL CARE, AND HEATING?: NOT HARD AT ALL

## 2023-07-24 SDOH — ECONOMIC STABILITY: FOOD INSECURITY: WITHIN THE PAST 12 MONTHS, THE FOOD YOU BOUGHT JUST DIDN'T LAST AND YOU DIDN'T HAVE MONEY TO GET MORE.: NEVER TRUE

## 2023-07-24 SDOH — ECONOMIC STABILITY: FOOD INSECURITY: WITHIN THE PAST 12 MONTHS, YOU WORRIED THAT YOUR FOOD WOULD RUN OUT BEFORE YOU GOT MONEY TO BUY MORE.: NEVER TRUE

## 2023-07-24 SDOH — ECONOMIC STABILITY: HOUSING INSECURITY
IN THE LAST 12 MONTHS, WAS THERE A TIME WHEN YOU DID NOT HAVE A STEADY PLACE TO SLEEP OR SLEPT IN A SHELTER (INCLUDING NOW)?: NO

## 2023-07-24 NOTE — TELEPHONE ENCOUNTER
PCP: Sonya Galdamez MD    Last appt: [unfilled]  Future Appointments   Date Time Provider [address]   8/11/2023  8:40 AM Sonya Galdamez MD [facility] AMB       REQUESTING FREESTYLE LITE STRIPS NOT ASCENSIA    ALSO, REQUESTING LANCETS    Requested Prescriptions     Pending Prescriptions Disp Refills    blood glucose test strips (ASCENSIA AUTODISC VI;ONE TOUCH ULTRA TEST VI) strip 100 each      Sig: As needed.        Prior labs and Blood pressures:  BP Readings from Last 3 Encounters:   05/17/23 120/75   02/09/23 134/72   01/30/23 132/68     Lab Results   Component Value Date/Time     05/17/2023 02:41 PM    K 4.6 05/17/2023 02:41 PM     05/17/2023 02:41 PM    CO2 27 05/17/2023 02:41 PM    BUN 14 05/17/2023 02:41 PM    GFRAA >60 03/22/2021 02:01 PM     No results found for: HBA1C, [id number]  Lab Results   Component Value Date/Time    CHOL 174 11/30/2022 09:06 AM    HDL 42 11/30/2022 09:06 AM     No results found for: VITD3, VD3RIA    Lab Results   Component Value Date/Time    TSH 1.29 10/26/2020 08:05 AM

## 2023-08-03 ENCOUNTER — TELEPHONE (OUTPATIENT)
Facility: CLINIC | Age: 73
End: 2023-08-03

## 2023-08-03 DIAGNOSIS — E11.9 TYPE 2 DIABETES MELLITUS WITHOUT COMPLICATION, WITHOUT LONG-TERM CURRENT USE OF INSULIN (HCC): Primary | ICD-10-CM

## 2023-08-03 RX ORDER — LANCETS 30 GAUGE
1 EACH MISCELLANEOUS DAILY
Qty: 100 EACH | Refills: 3 | Status: SHIPPED | OUTPATIENT
Start: 2023-08-03

## 2023-08-03 NOTE — TELEPHONE ENCOUNTER
PCP: Kindra Sams MD    Last appt: 05/17/23    [facility]  Requesting:    Lancets Freestyle 100's 28G        Future Appointments   Date Time Provider [address]   8/11/2023  8:40 AM Kindra Sams MD [facility] AMB       Requested Prescriptions      No prescriptions requested or ordered in this encounter       Prior labs and Blood pressures:  BP Readings from Last 3 Encounters:   07/24/23 129/69   05/17/23 120/75   02/09/23 134/72     Lab Results   Component Value Date/Time     05/17/2023 02:41 PM    K 4.6 05/17/2023 02:41 PM     05/17/2023 02:41 PM    CO2 27 05/17/2023 02:41 PM    BUN 14 05/17/2023 02:41 PM    GFRAA >60 03/22/2021 02:01 PM     No results found for: HBA1C, [id number]  Lab Results   Component Value Date/Time    CHOL 174 11/30/2022 09:06 AM    HDL 42 11/30/2022 09:06 AM     No results found for: VITD3, VD3RIA    Lab Results   Component Value Date/Time    TSH 1.29 10/26/2020 08:05 AM

## 2023-09-05 ENCOUNTER — TELEPHONE (OUTPATIENT)
Facility: CLINIC | Age: 73
End: 2023-09-05

## 2023-10-02 ENCOUNTER — TELEMEDICINE (OUTPATIENT)
Facility: CLINIC | Age: 73
End: 2023-10-02
Payer: MEDICARE

## 2023-10-02 DIAGNOSIS — E55.9 VITAMIN D DEFICIENCY: ICD-10-CM

## 2023-10-02 DIAGNOSIS — E11.9 TYPE 2 DIABETES MELLITUS WITHOUT COMPLICATION, WITHOUT LONG-TERM CURRENT USE OF INSULIN (HCC): Primary | ICD-10-CM

## 2023-10-02 DIAGNOSIS — I10 ESSENTIAL (PRIMARY) HYPERTENSION: ICD-10-CM

## 2023-10-02 DIAGNOSIS — E11.9 TYPE 2 DIABETES MELLITUS WITHOUT COMPLICATION, WITHOUT LONG-TERM CURRENT USE OF INSULIN (HCC): ICD-10-CM

## 2023-10-02 PROCEDURE — 3017F COLORECTAL CA SCREEN DOC REV: CPT | Performed by: NURSE PRACTITIONER

## 2023-10-02 PROCEDURE — 3051F HG A1C>EQUAL 7.0%<8.0%: CPT | Performed by: NURSE PRACTITIONER

## 2023-10-02 PROCEDURE — 1123F ACP DISCUSS/DSCN MKR DOCD: CPT | Performed by: NURSE PRACTITIONER

## 2023-10-02 PROCEDURE — G8427 DOCREV CUR MEDS BY ELIG CLIN: HCPCS | Performed by: NURSE PRACTITIONER

## 2023-10-02 PROCEDURE — 2022F DILAT RTA XM EVC RTNOPTHY: CPT | Performed by: NURSE PRACTITIONER

## 2023-10-02 PROCEDURE — 99214 OFFICE O/P EST MOD 30 MIN: CPT | Performed by: NURSE PRACTITIONER

## 2023-10-02 RX ORDER — LOSARTAN POTASSIUM 50 MG/1
50 TABLET ORAL DAILY
Qty: 90 TABLET | Refills: 1 | Status: SHIPPED | OUTPATIENT
Start: 2023-10-02

## 2023-10-02 ASSESSMENT — ENCOUNTER SYMPTOMS
COLOR CHANGE: 0
SHORTNESS OF BREATH: 0

## 2023-10-02 NOTE — PROGRESS NOTES
Salima Chan, was evaluated through a synchronous (real-time) audio-video encounter. The patient (or guardian if applicable) is aware that this is a billable service, which includes applicable co-pays. This Virtual Visit was conducted with patient's (and/or legal guardian's) consent. Patient identification was verified, and a caregiver was present when appropriate. The patient was located at Home: [facility] [address]  Provider was located at [facility] (Appt Dept): Brad Hernandez,  [address]       Salima Chan (:  1950) is a Established patient, presenting virtually for evaluation of the following:    Assessment & Plan   Below is the assessment and plan developed based on review of pertinent history, physical exam, labs, studies, and medications. 1. Type 2 diabetes mellitus without complication, without long-term current use of insulin ([address])  -     Comprehensive Metabolic Panel; Future  -     Hemoglobin A1C; Future  Patient was seen by virtual video. Reports taking medication as prescribed. Is due for A1c check. Does report trying to follow a healthy diet. Has not had any abnormal low blood sugars. 2. Essential (primary) hypertension  -     losartan (COZAAR) 50 MG tablet; Take 1 tablet by mouth daily, Disp-90 tablet, R-1Normal  -     Comprehensive Metabolic Panel; Future  Taking medication as prescribed medication refilled  3. Vitamin D deficiency  -     Vitamin D 25 Hydroxy; Future  History of vitamin D deficiency Labs due  No follow-ups on file. Subjective   Medication Refill  Pertinent negatives include no chest pain, fever, headaches, myalgias or weakness. Review of Systems   Constitutional:  Negative for fever. Eyes:  Negative for visual disturbance. Respiratory:  Negative for shortness of breath. Cardiovascular:  Negative for chest pain. Endocrine: Negative for polyphagia.    Genitourinary:  Negative for difficulty

## 2023-10-10 LAB
25(OH)D3+25(OH)D2 SERPL-MCNC: 28.5 NG/ML (ref 30–100)
ALBUMIN SERPL-MCNC: 4 G/DL (ref 3.8–4.8)
ALBUMIN/GLOB SERPL: 1.4 {RATIO} (ref 1.2–2.2)
ALP SERPL-CCNC: 87 IU/L (ref 44–121)
ALT SERPL-CCNC: 17 IU/L (ref 0–44)
AST SERPL-CCNC: 19 IU/L (ref 0–40)
BILIRUB SERPL-MCNC: 0.4 MG/DL (ref 0–1.2)
BUN SERPL-MCNC: 14 MG/DL (ref 8–27)
BUN/CREAT SERPL: 19 (ref 10–24)
CALCIUM SERPL-MCNC: 9.3 MG/DL (ref 8.6–10.2)
CHLORIDE SERPL-SCNC: 104 MMOL/L (ref 96–106)
CO2 SERPL-SCNC: 23 MMOL/L (ref 20–29)
CREAT SERPL-MCNC: 0.72 MG/DL (ref 0.76–1.27)
EGFRCR SERPLBLD CKD-EPI 2021: 96 ML/MIN/1.73
GLOBULIN SER CALC-MCNC: 2.8 G/DL (ref 1.5–4.5)
GLUCOSE SERPL-MCNC: 145 MG/DL (ref 70–99)
HBA1C MFR BLD: 7.4 % (ref 4.8–5.6)
POTASSIUM SERPL-SCNC: 4.9 MMOL/L (ref 3.5–5.2)
PROT SERPL-MCNC: 6.8 G/DL (ref 6–8.5)
SODIUM SERPL-SCNC: 141 MMOL/L (ref 134–144)

## 2023-12-05 ENCOUNTER — OFFICE VISIT (OUTPATIENT)
Facility: CLINIC | Age: 73
End: 2023-12-05
Payer: MEDICARE

## 2023-12-05 VITALS
BODY MASS INDEX: 27.94 KG/M2 | SYSTOLIC BLOOD PRESSURE: 133 MMHG | OXYGEN SATURATION: 98 % | RESPIRATION RATE: 20 BRPM | HEART RATE: 50 BPM | HEIGHT: 67 IN | TEMPERATURE: 97.9 F | DIASTOLIC BLOOD PRESSURE: 67 MMHG | WEIGHT: 178 LBS

## 2023-12-05 DIAGNOSIS — Z00.00 MEDICARE ANNUAL WELLNESS VISIT, SUBSEQUENT: Primary | ICD-10-CM

## 2023-12-05 DIAGNOSIS — Z23 NEED FOR PROPHYLACTIC VACCINATION AND INOCULATION AGAINST VARICELLA: ICD-10-CM

## 2023-12-05 DIAGNOSIS — R19.5 LOOSE BOWEL MOVEMENTS: ICD-10-CM

## 2023-12-05 DIAGNOSIS — E11.9 TYPE 2 DIABETES MELLITUS WITHOUT COMPLICATION, WITHOUT LONG-TERM CURRENT USE OF INSULIN (HCC): ICD-10-CM

## 2023-12-05 DIAGNOSIS — Z23 ENCOUNTER FOR IMMUNIZATION: ICD-10-CM

## 2023-12-05 DIAGNOSIS — I10 ESSENTIAL (PRIMARY) HYPERTENSION: ICD-10-CM

## 2023-12-05 DIAGNOSIS — E55.9 VITAMIN D DEFICIENCY: ICD-10-CM

## 2023-12-05 PROCEDURE — 3051F HG A1C>EQUAL 7.0%<8.0%: CPT | Performed by: FAMILY MEDICINE

## 2023-12-05 PROCEDURE — 3017F COLORECTAL CA SCREEN DOC REV: CPT | Performed by: FAMILY MEDICINE

## 2023-12-05 PROCEDURE — G0439 PPPS, SUBSEQ VISIT: HCPCS | Performed by: FAMILY MEDICINE

## 2023-12-05 PROCEDURE — 3075F SYST BP GE 130 - 139MM HG: CPT | Performed by: FAMILY MEDICINE

## 2023-12-05 PROCEDURE — 2022F DILAT RTA XM EVC RTNOPTHY: CPT | Performed by: FAMILY MEDICINE

## 2023-12-05 PROCEDURE — 99214 OFFICE O/P EST MOD 30 MIN: CPT | Performed by: FAMILY MEDICINE

## 2023-12-05 PROCEDURE — 3078F DIAST BP <80 MM HG: CPT | Performed by: FAMILY MEDICINE

## 2023-12-05 PROCEDURE — G8484 FLU IMMUNIZE NO ADMIN: HCPCS | Performed by: FAMILY MEDICINE

## 2023-12-05 PROCEDURE — G8427 DOCREV CUR MEDS BY ELIG CLIN: HCPCS | Performed by: FAMILY MEDICINE

## 2023-12-05 PROCEDURE — 1123F ACP DISCUSS/DSCN MKR DOCD: CPT | Performed by: FAMILY MEDICINE

## 2023-12-05 PROCEDURE — G8419 CALC BMI OUT NRM PARAM NOF/U: HCPCS | Performed by: FAMILY MEDICINE

## 2023-12-05 PROCEDURE — 1036F TOBACCO NON-USER: CPT | Performed by: FAMILY MEDICINE

## 2023-12-05 RX ORDER — METFORMIN HYDROCHLORIDE 500 MG/1
500 TABLET, EXTENDED RELEASE ORAL
Qty: 90 TABLET | Refills: 1 | Status: SHIPPED | OUTPATIENT
Start: 2023-12-05

## 2023-12-05 RX ORDER — LOSARTAN POTASSIUM 50 MG/1
50 TABLET ORAL DAILY
Qty: 90 TABLET | Refills: 1 | Status: SHIPPED | OUTPATIENT
Start: 2023-12-05

## 2023-12-05 ASSESSMENT — ENCOUNTER SYMPTOMS
BLOOD IN STOOL: 0
DIARRHEA: 1
SHORTNESS OF BREATH: 0

## 2023-12-05 ASSESSMENT — PATIENT HEALTH QUESTIONNAIRE - PHQ9
SUM OF ALL RESPONSES TO PHQ QUESTIONS 1-9: 0
SUM OF ALL RESPONSES TO PHQ9 QUESTIONS 1 & 2: 0
1. LITTLE INTEREST OR PLEASURE IN DOING THINGS: 0
2. FEELING DOWN, DEPRESSED OR HOPELESS: 0
SUM OF ALL RESPONSES TO PHQ QUESTIONS 1-9: 0

## 2023-12-05 NOTE — PATIENT INSTRUCTIONS
Heart-healthy proteins include seafood, lean meats and poultry, eggs, beans, peas, nuts, seeds, and soy products. Limit drinks and foods with added sugar. These include candy, desserts, and soda pop. Lifestyle changes    If your doctor recommends it, get more exercise. Walking is a good choice. Bit by bit, increase the amount you walk every day. Try for at least 30 minutes on most days of the week. You also may want to swim, bike, or do other activities. Do not smoke. If you need help quitting, talk to your doctor about stop-smoking programs and medicines. These can increase your chances of quitting for good. Quitting smoking may be the most important step you can take to protect your heart. It is never too late to quit. Limit alcohol to 2 drinks a day for men and 1 drink a day for women. Too much alcohol can cause health problems. Manage other health problems such as diabetes, high blood pressure, and high cholesterol. If you think you may have a problem with alcohol or drug use, talk to your doctor. Medicines    Take your medicines exactly as prescribed. Call your doctor if you think you are having a problem with your medicine. If your doctor recommends aspirin, take the amount directed each day. Make sure you take aspirin and not another kind of pain reliever, such as acetaminophen (Tylenol). When should you call for help? Call 911 if you have symptoms of a heart attack. These may include:    Chest pain or pressure, or a strange feeling in the chest.     Sweating. Shortness of breath. Pain, pressure, or a strange feeling in the back, neck, jaw, or upper belly or in one or both shoulders or arms. Lightheadedness or sudden weakness. A fast or irregular heartbeat. After you call 911, the  may tell you to chew 1 adult-strength or 2 to 4 low-dose aspirin. Wait for an ambulance. Do not try to drive yourself.   Watch closely for changes in your health, and be sure to

## 2023-12-05 NOTE — PROGRESS NOTES
Meade Fothergill  74 y.o. male  1950  [id number]  [facility] MEDICINE  Progress Note     Encounter Date: 12/5/2023    Assessment and Plan:       ICD-10-CM    1. Medicare annual wellness visit, subsequent  Z00.00       2. Need for prophylactic vaccination and inoculation against varicella  Z23 zoster recombinant adjuvanted vaccine (SHINGRIX) 50 MCG/0.5ML SUSR injection      3. Encounter for immunization  Z23 pneumococcal 20-valent conjugat (PREVNAR) 0.5 ML CJ inj      4. Type 2 diabetes mellitus without complication, without long-term current use of insulin (HCC)  E11.9 CBC with Auto Differential     Hepatic Function Panel     Basic Metabolic Panel     Lipid Panel      DIABETES FOOT EXAM     Microalbumin / Creatinine Urine Ratio     metFORMIN (GLUCOPHAGE-XR) 500 MG extended release tablet      5. Essential (primary) hypertension  A13 Basic Metabolic Panel     Microalbumin / Creatinine Urine Ratio     losartan (COZAAR) 50 MG tablet      6. Loose bowel movements  R19.5 Ova+Parasite + Giardia     Culture, Stool      7. Vitamin D deficiency  E55.9         1. Medicare annual wellness visit, subsequent  updated  2. Need for prophylactic vaccination and inoculation against varicella  -     zoster recombinant adjuvanted vaccine [facility]) 50 MCG/0.5ML SUSR injection; Inject 0.5 mLs into the muscle once for 1 dose, Disp-0.5 mL, R-0Print  3. Encounter for immunization  -     pneumococcal 20-valent conjugat (PREVNAR) 0.5 ML CJ inj; Inject 0.5 mLs into the muscle once for 1 dose, Disp-0.5 mL, R-0Print  4. Type 2 diabetes mellitus without complication, without long-term current use of insulin (HCC)  Now A1c is 7.4  On diet-discussed  Trial of metformin-will get via mail order. Stool studies should be done by that time. -     CBC with Auto Differential; Future  -     Hepatic Function Panel; Future  -     Basic Metabolic Panel; Future  -     Lipid Panel;  Future  -      DIABETES FOOT EXAM  -

## 2023-12-12 DIAGNOSIS — R19.5 LOOSE BOWEL MOVEMENTS: ICD-10-CM

## 2023-12-12 DIAGNOSIS — I10 ESSENTIAL (PRIMARY) HYPERTENSION: ICD-10-CM

## 2023-12-12 DIAGNOSIS — E11.9 TYPE 2 DIABETES MELLITUS WITHOUT COMPLICATION, WITHOUT LONG-TERM CURRENT USE OF INSULIN (HCC): ICD-10-CM

## 2023-12-12 LAB
ALBUMIN SERPL-MCNC: 3.6 G/DL (ref 3.5–5)
ALBUMIN/GLOB SERPL: 1.1 (ref 1.1–2.2)
ALP SERPL-CCNC: 78 U/L (ref 45–117)
ALT SERPL-CCNC: 27 U/L (ref 12–78)
ANION GAP SERPL CALC-SCNC: 2 MMOL/L (ref 5–15)
AST SERPL-CCNC: 21 U/L (ref 15–37)
BASOPHILS # BLD: 0.1 K/UL (ref 0–0.1)
BASOPHILS NFR BLD: 1 % (ref 0–1)
BILIRUB DIRECT SERPL-MCNC: 0.1 MG/DL (ref 0–0.2)
BILIRUB SERPL-MCNC: 0.5 MG/DL (ref 0.2–1)
BUN SERPL-MCNC: 13 MG/DL (ref 6–20)
BUN/CREAT SERPL: 17 (ref 12–20)
CALCIUM SERPL-MCNC: 9 MG/DL (ref 8.5–10.1)
CHOLEST SERPL-MCNC: 141 MG/DL
CO2 SERPL-SCNC: 28 MMOL/L (ref 21–32)
COMMENT:: NORMAL
CREAT UR-MCNC: 31.5 MG/DL
DIFFERENTIAL METHOD BLD: NORMAL
EOSINOPHIL # BLD: 0.3 K/UL (ref 0–0.4)
EOSINOPHIL NFR BLD: 4 % (ref 0–7)
ERYTHROCYTE [DISTWIDTH] IN BLOOD BY AUTOMATED COUNT: 12.4 % (ref 11.5–14.5)
GLOBULIN SER CALC-MCNC: 3.3 G/DL (ref 2–4)
GLUCOSE SERPL-MCNC: 140 MG/DL (ref 65–100)
HCT VFR BLD AUTO: 43.3 % (ref 36.6–50.3)
HDLC SERPL-MCNC: 42 MG/DL
HDLC SERPL: 3.4 (ref 0–5)
HGB BLD-MCNC: 14.4 G/DL (ref 12.1–17)
IMM GRANULOCYTES # BLD AUTO: 0 K/UL (ref 0–0.04)
IMM GRANULOCYTES NFR BLD AUTO: 0 % (ref 0–0.5)
LDLC SERPL CALC-MCNC: 80.2 MG/DL (ref 0–100)
LYMPHOCYTES # BLD: 1.9 K/UL (ref 0.8–3.5)
LYMPHOCYTES NFR BLD: 27 % (ref 12–49)
MCH RBC QN AUTO: 31.9 PG (ref 26–34)
MCHC RBC AUTO-ENTMCNC: 33.3 G/DL (ref 30–36.5)
MCV RBC AUTO: 96 FL (ref 80–99)
MICROALBUMIN UR-MCNC: <0.5 MG/DL
MICROALBUMIN/CREAT UR-RTO: <16 MG/G (ref 0–30)
MONOCYTES # BLD: 0.5 K/UL (ref 0–1)
MONOCYTES NFR BLD: 7 % (ref 5–13)
NEUTS SEG # BLD: 4.3 K/UL (ref 1.8–8)
NEUTS SEG NFR BLD: 61 % (ref 32–75)
NRBC # BLD: 0 K/UL (ref 0–0.01)
NRBC BLD-RTO: 0 PER 100 WBC
PLATELET # BLD AUTO: 207 K/UL (ref 150–400)
PMV BLD AUTO: 9.8 FL (ref 8.9–12.9)
POTASSIUM SERPL-SCNC: 4.4 MMOL/L (ref 3.5–5.1)
PROT SERPL-MCNC: 6.9 G/DL (ref 6.4–8.2)
RBC # BLD AUTO: 4.51 M/UL (ref 4.1–5.7)
SODIUM SERPL-SCNC: 140 MMOL/L (ref 136–145)
SPECIMEN HOLD: NORMAL
TRIGL SERPL-MCNC: 94 MG/DL
VLDLC SERPL CALC-MCNC: 18.8 MG/DL
WBC # BLD AUTO: 7.1 K/UL (ref 4.1–11.1)

## 2023-12-17 LAB
BACTERIA SPEC CULT: NORMAL
CAMPYLOBACTER STL CULT: NORMAL
E COLI SXT STL QL IA: NEGATIVE
SALMONELLA/SHIGELLA SCREEN: NORMAL
SPECIMEN SOURCE: NORMAL

## 2023-12-20 LAB
G LAMBLIA AG STL QL IA: NEGATIVE
O+P STL CONC: NORMAL
SPECIMEN SOURCE: NORMAL

## 2024-02-26 ENCOUNTER — TELEPHONE (OUTPATIENT)
Facility: CLINIC | Age: 74
End: 2024-02-26

## 2024-02-27 DIAGNOSIS — Z12.2 ENCOUNTER FOR SCREENING FOR MALIGNANT NEOPLASM OF LUNG IN PATIENT WITH LESS THAN 30 PACK YEAR SMOKING HISTORY: Primary | ICD-10-CM

## 2024-02-27 DIAGNOSIS — Z87.891 ENCOUNTER FOR SCREENING FOR MALIGNANT NEOPLASM OF LUNG IN PATIENT WITH LESS THAN 30 PACK YEAR SMOKING HISTORY: Primary | ICD-10-CM

## 2024-04-04 ENCOUNTER — HOSPITAL ENCOUNTER (OUTPATIENT)
Facility: HOSPITAL | Age: 74
Discharge: HOME | End: 2024-04-04
Attending: FAMILY MEDICINE
Payer: MEDICARE

## 2024-04-04 DIAGNOSIS — Z87.891 ENCOUNTER FOR SCREENING FOR MALIGNANT NEOPLASM OF LUNG IN PATIENT WITH LESS THAN 30 PACK YEAR SMOKING HISTORY: ICD-10-CM

## 2024-04-04 DIAGNOSIS — Z12.2 ENCOUNTER FOR SCREENING FOR MALIGNANT NEOPLASM OF LUNG IN PATIENT WITH LESS THAN 30 PACK YEAR SMOKING HISTORY: ICD-10-CM

## 2024-04-04 PROCEDURE — 71271 CT THORAX LUNG CANCER SCR C-: CPT

## 2024-06-10 ENCOUNTER — OFFICE VISIT (OUTPATIENT)
Facility: CLINIC | Age: 74
End: 2024-06-10
Payer: MEDICARE

## 2024-06-10 VITALS
OXYGEN SATURATION: 96 % | DIASTOLIC BLOOD PRESSURE: 72 MMHG | HEIGHT: 67 IN | RESPIRATION RATE: 17 BRPM | HEART RATE: 57 BPM | WEIGHT: 178 LBS | BODY MASS INDEX: 27.94 KG/M2 | SYSTOLIC BLOOD PRESSURE: 128 MMHG | TEMPERATURE: 97.9 F

## 2024-06-10 DIAGNOSIS — E11.9 TYPE 2 DIABETES MELLITUS WITHOUT COMPLICATION, WITHOUT LONG-TERM CURRENT USE OF INSULIN (HCC): Primary | ICD-10-CM

## 2024-06-10 DIAGNOSIS — I10 ESSENTIAL (PRIMARY) HYPERTENSION: ICD-10-CM

## 2024-06-10 DIAGNOSIS — I25.10 CORONARY ARTERY DISEASE INVOLVING NATIVE CORONARY ARTERY OF NATIVE HEART WITHOUT ANGINA PECTORIS: ICD-10-CM

## 2024-06-10 PROCEDURE — 3074F SYST BP LT 130 MM HG: CPT | Performed by: FAMILY MEDICINE

## 2024-06-10 PROCEDURE — 1123F ACP DISCUSS/DSCN MKR DOCD: CPT | Performed by: FAMILY MEDICINE

## 2024-06-10 PROCEDURE — 2022F DILAT RTA XM EVC RTNOPTHY: CPT | Performed by: FAMILY MEDICINE

## 2024-06-10 PROCEDURE — 3078F DIAST BP <80 MM HG: CPT | Performed by: FAMILY MEDICINE

## 2024-06-10 PROCEDURE — G8419 CALC BMI OUT NRM PARAM NOF/U: HCPCS | Performed by: FAMILY MEDICINE

## 2024-06-10 PROCEDURE — G8427 DOCREV CUR MEDS BY ELIG CLIN: HCPCS | Performed by: FAMILY MEDICINE

## 2024-06-10 PROCEDURE — 1036F TOBACCO NON-USER: CPT | Performed by: FAMILY MEDICINE

## 2024-06-10 PROCEDURE — 3046F HEMOGLOBIN A1C LEVEL >9.0%: CPT | Performed by: FAMILY MEDICINE

## 2024-06-10 PROCEDURE — 99214 OFFICE O/P EST MOD 30 MIN: CPT | Performed by: FAMILY MEDICINE

## 2024-06-10 PROCEDURE — 3017F COLORECTAL CA SCREEN DOC REV: CPT | Performed by: FAMILY MEDICINE

## 2024-06-10 RX ORDER — LOSARTAN POTASSIUM 50 MG/1
50 TABLET ORAL DAILY
Qty: 90 TABLET | Refills: 1 | Status: SHIPPED | OUTPATIENT
Start: 2024-06-10

## 2024-06-10 RX ORDER — LANCETS 30 GAUGE
1 EACH MISCELLANEOUS DAILY
Qty: 100 EACH | Refills: 3 | Status: SHIPPED | OUTPATIENT
Start: 2024-06-10

## 2024-06-10 ASSESSMENT — PATIENT HEALTH QUESTIONNAIRE - PHQ9
SUM OF ALL RESPONSES TO PHQ QUESTIONS 1-9: 0
2. FEELING DOWN, DEPRESSED OR HOPELESS: NOT AT ALL
SUM OF ALL RESPONSES TO PHQ9 QUESTIONS 1 & 2: 0
1. LITTLE INTEREST OR PLEASURE IN DOING THINGS: NOT AT ALL

## 2024-06-10 ASSESSMENT — ENCOUNTER SYMPTOMS
SHORTNESS OF BREATH: 0
BLOOD IN STOOL: 0

## 2024-06-10 NOTE — PATIENT INSTRUCTIONS
Diabetes:  Blood sugar goals:  Hemoglobin A1c under 7  Fasting blood sugar   Blood sugar 2 hours after a meal under 180, 4 hours after a meal under 120  No hypoglycemia (sugars under 70 and symptomatic low sugars)    Blood sugar control with diet and exercise:  Exercise 45 minutes per day.  This makes your insulin work better.  It also allows insulin levels to fall helping with weight loss.  Every night, try to fast from your evening meal to breakfast (at least 12 hours) without eating anything.  This uses stored energy in your liver and makes insulin work better.  Avoid simples sugars such as table sugar in drinks (sodas, lemonade, sweet tea, wine), desserts, candy.  Also avoid fruit juices and high fructose corn syrup.  Avoid frequent consumption of fruit especially grapes and bananas which are high in sugar. Finally, drink less milk which has milk sugar in it.  Control your starch intake.  Men should have 3-4 carb portions per meal.  Women should have 2-3 carb portions per meal.  A carb serving is the equivalent of a slice of bread.    Control your blood pressure and cholesterol.  These problems are common in diabetes.  AND, don't smoke.  All of these problems contribute to heart disease and stroke risk.    Get a yearly eye exam and flu shot.    Make sure your vaccines are up to date particularly the pneumonia vaccine.    Inspect your feet daily.  Wear comfortable protective shoes and clean socks.  Seek medical care if there are sore, calluses or numbness and burning of your feet.     See your doctor at least every 6 months if you are on oral medicines or more often if the diabetic control is not at goal or if you are on insulin.  Take your medicines religiously.           The goal blood pressure for most patients is 140/90.    The whole point of treating blood pressure is to prevent strokes, heart attacks and kidney damage.  Persistently elevated blood pressure damages blood vessels and can lead to

## 2024-06-10 NOTE — PROGRESS NOTES
Identified patient with two patient identifiers (name and ). Reviewed chart in preparation for visit and have obtained necessary documentation.    Bob Martino is a 73 y.o. male  Chief Complaint   Patient presents with    Medication Refill    Hypertension     A1C also needs refill on glucometer test strips     /72 (Site: Right Upper Arm, Position: Sitting, Cuff Size: Medium Adult)   Pulse 57   Temp 97.9 °F (36.6 °C) (Oral)   Resp 17   Ht 1.702 m (5' 7\")   Wt 80.7 kg (178 lb)   SpO2 96%   BMI 27.88 kg/m²     1. Have you been to the ER, urgent care clinic since your last visit?  Hospitalized since your last visit?no    2. Have you seen or consulted any other health care providers outside of the [facility] since your last visit?  Include any pap smears or colon screening. no   
    Current Outpatient Medications   Medication Sig    losartan (COZAAR) 50 MG tablet Take 1 tablet by mouth daily    blood glucose test strips (ASCENSIA AUTODISC VI;ONE TOUCH ULTRA TEST VI) strip Test fasting blood sugar twice daily. Dx E11.9    Lancets MISC 1 each by Does not apply route daily    acetaminophen (TYLENOL) 500 MG tablet Take 1 tablet by mouth daily    aspirin 81 MG EC tablet Take 1 tablet by mouth daily    vitamin D (CHOLECALCIFEROL) 25 MCG (1000 UT) TABS tablet Take 2 tablets by mouth daily    Flaxseed, Linseed, (FLAX SEED OIL) 1000 MG CAPS Take 1,000 mg by mouth 2 times daily     No current facility-administered medications for this visit.     Medications Discontinued During This Encounter   Medication Reason    metFORMIN (GLUCOPHAGE-XR) 500 MG extended release tablet Not A Current Medication    blood glucose test strips (ASCENSIA AUTODISC VI;ONE TOUCH ULTRA TEST VI) strip REORDER    Lancets MISC REORDER    losartan (COZAAR) 50 MG tablet REORDER     ~~~~~~~~~~~~~~~~~~~~~~~~~~~~~~~~~~~~~~~~~~~~~~~~~~~~~~~~~~~    Chief Complaint   Patient presents with    Medication Refill    Hypertension     A1C also needs refill on glucometer test strips       History provided by patient  History of Present Illness   Bob Martino is a 73 y.o. male who presents to clinic today for:  Medication Refill and Hypertension (A1C also needs refill on glucometer test strips)    DM2  Last A1c 7.4  Diet only  Checks sugars daily 140-150's throughout the day.  Sticks to diet.  Eyes are up to date  Feet are good.    BP  Losartan only  Takes consistently    ASCAD with splints in past  Not on statin due to memory issues  Takes flaxseed oil and uses olive oil.  Sees Cardiology, Dr. He      Health Maintenance  Completed HM gaps at today's visit  Health Maintenance Due   Topic Date Due    Pneumococcal 65+ years Vaccine (1 of 2 - PCV) 11/12/1956    Shingles vaccine (1 of 2) Never done    Respiratory Syncytial Virus (RSV)

## 2024-08-12 ENCOUNTER — TELEPHONE (OUTPATIENT)
Facility: CLINIC | Age: 74
End: 2024-08-12

## 2024-08-12 NOTE — TELEPHONE ENCOUNTER
Patient contacted the office regarding him and his wife both have tested positive for covid w/ home test patient requesting paxlovid,patient was told by doctor on call Dr. Robles to go to urgent care on 8/10 if symptoms get worst.

## 2024-08-12 NOTE — TELEPHONE ENCOUNTER
Received page on 8/10/24 at 9:14 PM.  Directly returned phone call.  Patient stated that he and his wife have been having upper respiratory type symptoms and have COVID. Day 1 of symptoms He is requesting Paxlovid.  Advised patient that he would need further evaluation to determine if treatment is necessary.  Patient is currently afebrile and denies any shortness of breath or chest pain.  Therefore advised patient that he could be evaluated at urgent care or if symptoms remain mild can call the office Monday morning to see if an appointment is available.  ED precautions reviewed.  Patient stated understanding.

## 2024-11-28 DIAGNOSIS — I10 ESSENTIAL (PRIMARY) HYPERTENSION: ICD-10-CM

## 2024-12-02 RX ORDER — LOSARTAN POTASSIUM 50 MG/1
50 TABLET ORAL DAILY
Qty: 90 TABLET | Refills: 1 | Status: SHIPPED | OUTPATIENT
Start: 2024-12-02

## 2024-12-03 NOTE — TELEPHONE ENCOUNTER
Call him. I have ordered his routine diabetes labs. Should be done here and do not need to be fasting. He should see me in April about his diabetes and meds anyway so get him appt too.   [facility] Normal

## 2025-01-14 ENCOUNTER — TELEPHONE (OUTPATIENT)
Facility: CLINIC | Age: 75
End: 2025-01-14

## 2025-01-14 NOTE — TELEPHONE ENCOUNTER
----- Message from Neda LEE sent at 1/14/2025  3:01 PM EST -----  Regarding: [facility] Appointment Request  [facility] Appointment Request    Patient needs appointment for [facility] Appointment Type: Pre-Op Visit.    Patient Requested Dates(s): Need appointment before January 29,   Patient Requested Time: Anytime  Provider Name:Lisandro Dai MD    Reason for Appointment Request: Established Patient - Available appointments did not meet patient need  Patient need EKG and Labwork done  --------------------------------------------------------------------------------------------------------------------------    Relationship to Patient: Self     Call Back Information: OK to leave message on voicemail  Preferred Call Back Number: Phone [phone], [phone]  
Previous C/S

## 2025-01-24 ENCOUNTER — OFFICE VISIT (OUTPATIENT)
Facility: CLINIC | Age: 75
End: 2025-01-24

## 2025-01-24 VITALS
HEIGHT: 67 IN | OXYGEN SATURATION: 97 % | RESPIRATION RATE: 16 BRPM | HEART RATE: 64 BPM | DIASTOLIC BLOOD PRESSURE: 75 MMHG | SYSTOLIC BLOOD PRESSURE: 147 MMHG | TEMPERATURE: 97.5 F | WEIGHT: 182 LBS | BODY MASS INDEX: 28.56 KG/M2

## 2025-01-24 DIAGNOSIS — Z01.818 PREOP EXAMINATION: Primary | ICD-10-CM

## 2025-01-24 DIAGNOSIS — I10 ESSENTIAL HYPERTENSION: ICD-10-CM

## 2025-01-24 DIAGNOSIS — Z95.5 HISTORY OF CORONARY ARTERY STENT PLACEMENT: ICD-10-CM

## 2025-01-24 DIAGNOSIS — E11.9 TYPE 2 DIABETES MELLITUS WITHOUT COMPLICATION, WITHOUT LONG-TERM CURRENT USE OF INSULIN (HCC): ICD-10-CM

## 2025-01-24 DIAGNOSIS — I25.10 CORONARY ARTERY DISEASE INVOLVING NATIVE CORONARY ARTERY OF NATIVE HEART WITHOUT ANGINA PECTORIS: ICD-10-CM

## 2025-01-24 DIAGNOSIS — G89.29 CHRONIC PAIN OF LEFT WRIST: ICD-10-CM

## 2025-01-24 DIAGNOSIS — E78.2 MIXED HYPERLIPIDEMIA: ICD-10-CM

## 2025-01-24 DIAGNOSIS — M25.532 CHRONIC PAIN OF LEFT WRIST: ICD-10-CM

## 2025-01-24 LAB
ALBUMIN SERPL-MCNC: 3.6 G/DL (ref 3.5–5)
ALBUMIN/GLOB SERPL: 1 (ref 1.1–2.2)
ALP SERPL-CCNC: 88 U/L (ref 45–117)
ALT SERPL-CCNC: 22 U/L (ref 12–78)
ANION GAP SERPL CALC-SCNC: 5 MMOL/L (ref 2–12)
AST SERPL-CCNC: 19 U/L (ref 15–37)
BASOPHILS # BLD: 0.06 K/UL (ref 0–0.1)
BASOPHILS NFR BLD: 0.8 % (ref 0–1)
BILIRUB SERPL-MCNC: 0.5 MG/DL (ref 0.2–1)
BUN SERPL-MCNC: 12 MG/DL (ref 6–20)
BUN/CREAT SERPL: 14 (ref 12–20)
CALCIUM SERPL-MCNC: 9.8 MG/DL (ref 8.5–10.1)
CHLORIDE SERPL-SCNC: 104 MMOL/L (ref 97–108)
CO2 SERPL-SCNC: 30 MMOL/L (ref 21–32)
CREAT SERPL-MCNC: 0.83 MG/DL (ref 0.7–1.3)
DIFFERENTIAL METHOD BLD: NORMAL
EOSINOPHIL # BLD: 0.39 K/UL (ref 0–0.4)
EOSINOPHIL NFR BLD: 5.4 % (ref 0–7)
ERYTHROCYTE [DISTWIDTH] IN BLOOD BY AUTOMATED COUNT: 12.4 % (ref 11.5–14.5)
GLOBULIN SER CALC-MCNC: 3.5 G/DL (ref 2–4)
GLUCOSE SERPL-MCNC: 217 MG/DL (ref 65–100)
HCT VFR BLD AUTO: 44.1 % (ref 36.6–50.3)
HGB BLD-MCNC: 14.3 G/DL (ref 12.1–17)
IMM GRANULOCYTES # BLD AUTO: 0.03 K/UL (ref 0–0.04)
IMM GRANULOCYTES NFR BLD AUTO: 0.4 % (ref 0–0.5)
LYMPHOCYTES # BLD: 1.52 K/UL (ref 0.8–3.5)
LYMPHOCYTES NFR BLD: 21.2 % (ref 12–49)
MCH RBC QN AUTO: 31.9 PG (ref 26–34)
MCHC RBC AUTO-ENTMCNC: 32.4 G/DL (ref 30–36.5)
MCV RBC AUTO: 98.4 FL (ref 80–99)
MONOCYTES # BLD: 0.54 K/UL (ref 0–1)
MONOCYTES NFR BLD: 7.5 % (ref 5–13)
NEUTS SEG # BLD: 4.64 K/UL (ref 1.8–8)
NEUTS SEG NFR BLD: 64.7 % (ref 32–75)
NRBC # BLD: 0 K/UL (ref 0–0.01)
NRBC BLD-RTO: 0 PER 100 WBC
PLATELET # BLD AUTO: 224 K/UL (ref 150–400)
PMV BLD AUTO: 9.8 FL (ref 8.9–12.9)
POTASSIUM SERPL-SCNC: 4.6 MMOL/L (ref 3.5–5.1)
PROT SERPL-MCNC: 7.1 G/DL (ref 6.4–8.2)
RBC # BLD AUTO: 4.48 M/UL (ref 4.1–5.7)
SODIUM SERPL-SCNC: 139 MMOL/L (ref 136–145)
WBC # BLD AUTO: 7.2 K/UL (ref 4.1–11.1)

## 2025-01-24 SDOH — ECONOMIC STABILITY: FOOD INSECURITY: WITHIN THE PAST 12 MONTHS, YOU WORRIED THAT YOUR FOOD WOULD RUN OUT BEFORE YOU GOT MONEY TO BUY MORE.: NEVER TRUE

## 2025-01-24 SDOH — ECONOMIC STABILITY: FOOD INSECURITY: WITHIN THE PAST 12 MONTHS, THE FOOD YOU BOUGHT JUST DIDN'T LAST AND YOU DIDN'T HAVE MONEY TO GET MORE.: NEVER TRUE

## 2025-01-24 ASSESSMENT — PATIENT HEALTH QUESTIONNAIRE - PHQ9
SUM OF ALL RESPONSES TO PHQ9 QUESTIONS 1 & 2: 0
SUM OF ALL RESPONSES TO PHQ QUESTIONS 1-9: 0
SUM OF ALL RESPONSES TO PHQ QUESTIONS 1-9: 0
2. FEELING DOWN, DEPRESSED OR HOPELESS: NOT AT ALL
SUM OF ALL RESPONSES TO PHQ QUESTIONS 1-9: 0
SUM OF ALL RESPONSES TO PHQ QUESTIONS 1-9: 0
1. LITTLE INTEREST OR PLEASURE IN DOING THINGS: NOT AT ALL

## 2025-01-24 NOTE — PROGRESS NOTES
\"Have you been to the ER, urgent care clinic since your last visit?  Hospitalized since your last visit?\"    NO    “Have you seen or consulted any other health care providers outside our system since your last visit?”    NO           Chief Complaint   Patient presents with    Pre-op Exam     2/5/2025  Left Wrist     Health Maintenance Due   Topic Date Due    Pneumococcal 65+ years Vaccine (1 of 2 - PCV) 11/12/1956    Shingles vaccine (1 of 2) Never done    Respiratory Syncytial Virus (RSV) Pregnant or age 60 yrs+ (1 - 1-dose 75+ series) Never done    DTaP/Tdap/Td vaccine (2 - Td or Tdap) 01/01/2024    Flu vaccine (1) 08/01/2024    COVID-19 Vaccine (4 - 2023-24 season) 09/01/2024    Annual Wellness Visit (Medicare)  12/05/2024    Diabetic Alb to Cr ratio (uACR) test  12/12/2024     PHQ-9 Total Score: 0 (1/24/2025  8:22 AM)        1/24/2025     8:00 AM   AMB Abuse Screening   Do you ever feel afraid of your partner? N   Are you in a relationship with someone who physically or mentally threatens you? N   Is it safe for you to go home? Y     Vitals:    01/24/25 0821   BP: (!) 170/74   Pulse: 64   Resp: 16   Temp: 97.5 °F (36.4 °C)   SpO2: 97%       
lab work and EKG.  Continue with current medications.  Will reach out to Ortho/cardiology regarding aspirin hold for 7 days prior to surgery.  Keep follow-up with PCP next month for diabetes as scheduled.    Return in about 1 month (around 2/24/2025) for Follow up, Diabetes, with PCP.     Patient is cleared for left wrist surgery under nerve block anesthesia.    12.50 pm Called cardiology office Dr. He, spoke to staff Dominique, left message with her regarding patient's discontinuation of aspirin 7 days prior to surgery, gave her cell phone number to get back regarding clearance on aspirin hold. Awaiting call.     Call 911 or go to ER if shortness of breath,Chest pain or worsening of symptoms.    Diagnosis and plan discussed with patient who verbillized understanding.    01/28/25: Received Fax copy from cardiology office, okay to hold ASA 81 mg for 5-7 days prior to procedure, restart in 2-3 days post surgery.    Called pt x 2, LVM regarding above msg, 12.30 pm.     01/29/25: Called pt at 8.30 am, pt did get VM from yesterday, reminder no aspirin from today 01/29/25. Pt understands.     Please note that this dictation may have been completed with Dragon, the computer voice recognition software.  Quite often unanticipated grammatical, syntax, homophones, and other interpretive errors are inadvertently transcribed by the computer software.  Please disregard these errors.  Please excuse any errors that have escaped final proofreading.

## 2025-01-24 NOTE — RESULT ENCOUNTER NOTE
Recent lab results showed,  Elevated random sugar. Known hx of DM.   Rest of labs are fine with mild changes.

## 2025-01-28 ENCOUNTER — TELEPHONE (OUTPATIENT)
Facility: CLINIC | Age: 75
End: 2025-01-28

## 2025-01-28 NOTE — TELEPHONE ENCOUNTER
[facility] Pre op forms reviewed and signed by BERNIE Esparza.    Pre op forms sent via fax to [phone]  Completed forms placed at the front office for scan.

## 2025-02-13 SDOH — HEALTH STABILITY: PHYSICAL HEALTH: ON AVERAGE, HOW MANY MINUTES DO YOU ENGAGE IN EXERCISE AT THIS LEVEL?: 60 MIN

## 2025-02-13 SDOH — HEALTH STABILITY: PHYSICAL HEALTH: ON AVERAGE, HOW MANY DAYS PER WEEK DO YOU ENGAGE IN MODERATE TO STRENUOUS EXERCISE (LIKE A BRISK WALK)?: 4 DAYS

## 2025-02-13 ASSESSMENT — PATIENT HEALTH QUESTIONNAIRE - PHQ9
1. LITTLE INTEREST OR PLEASURE IN DOING THINGS: NOT AT ALL
2. FEELING DOWN, DEPRESSED OR HOPELESS: NOT AT ALL
SUM OF ALL RESPONSES TO PHQ QUESTIONS 1-9: 0
SUM OF ALL RESPONSES TO PHQ9 QUESTIONS 1 & 2: 0
SUM OF ALL RESPONSES TO PHQ QUESTIONS 1-9: 0

## 2025-02-13 ASSESSMENT — LIFESTYLE VARIABLES
HOW OFTEN DO YOU HAVE A DRINK CONTAINING ALCOHOL: 1
HOW OFTEN DO YOU HAVE A DRINK CONTAINING ALCOHOL: NEVER
HOW MANY STANDARD DRINKS CONTAINING ALCOHOL DO YOU HAVE ON A TYPICAL DAY: PATIENT DOES NOT DRINK
HOW OFTEN DO YOU HAVE SIX OR MORE DRINKS ON ONE OCCASION: 1
HOW MANY STANDARD DRINKS CONTAINING ALCOHOL DO YOU HAVE ON A TYPICAL DAY: 0

## 2025-02-17 ENCOUNTER — OFFICE VISIT (OUTPATIENT)
Facility: CLINIC | Age: 75
End: 2025-02-17

## 2025-02-17 VITALS
WEIGHT: 182 LBS | HEIGHT: 67 IN | BODY MASS INDEX: 28.56 KG/M2 | TEMPERATURE: 97.7 F | SYSTOLIC BLOOD PRESSURE: 126 MMHG | OXYGEN SATURATION: 100 % | RESPIRATION RATE: 17 BRPM | DIASTOLIC BLOOD PRESSURE: 75 MMHG | HEART RATE: 57 BPM

## 2025-02-17 DIAGNOSIS — I25.10 CORONARY ARTERY DISEASE INVOLVING NATIVE CORONARY ARTERY OF NATIVE HEART WITHOUT ANGINA PECTORIS: ICD-10-CM

## 2025-02-17 DIAGNOSIS — Z00.00 MEDICARE ANNUAL WELLNESS VISIT, SUBSEQUENT: Primary | ICD-10-CM

## 2025-02-17 DIAGNOSIS — I10 ESSENTIAL HYPERTENSION: ICD-10-CM

## 2025-02-17 DIAGNOSIS — E78.2 MIXED HYPERLIPIDEMIA: ICD-10-CM

## 2025-02-17 DIAGNOSIS — Z78.9 STATIN INTOLERANCE: ICD-10-CM

## 2025-02-17 DIAGNOSIS — R41.3 MEMORY LOSS: ICD-10-CM

## 2025-02-17 DIAGNOSIS — E11.9 TYPE 2 DIABETES MELLITUS WITHOUT COMPLICATION, WITHOUT LONG-TERM CURRENT USE OF INSULIN (HCC): ICD-10-CM

## 2025-02-17 DIAGNOSIS — Z23 ENCOUNTER FOR IMMUNIZATION: ICD-10-CM

## 2025-02-17 RX ORDER — UBIDECARENONE 75 MG
50 CAPSULE ORAL DAILY
COMMUNITY

## 2025-02-17 ASSESSMENT — ENCOUNTER SYMPTOMS
SHORTNESS OF BREATH: 0
BLOOD IN STOOL: 0

## 2025-02-17 NOTE — PROGRESS NOTES
Bob Martino  74 y.o. male  1950  MRN:[id number]  [facility]  Progress Note     Encounter Date: 2/17/2025    Assessment and Plan:       ICD-10-CM    1. Medicare annual wellness visit, subsequent  Z00.00       2. Encounter for immunization  Z23 zoster recombinant adjuvanted vaccine (SHINGRIX) 50 MCG/0.5ML SUSR injection      3. Memory loss  R41.3 CBC with Auto Differential     Basic Metabolic Panel     Hepatic Function Panel     TSH     RPR     TAVARES by IFA w/Reflex     Vitamin B12     Jaden Aj MD, Neurology, [place]     CT HEAD WO CONTRAST      4. Essential hypertension  I10       5. Coronary artery disease involving native coronary artery of native heart without angina pectoris  I25.10       6. Mixed hyperlipidemia  E78.2 Lipid Panel      7. Type 2 diabetes mellitus without complication, without long-term current use of insulin (HCC)  E11.9 Hemoglobin A1C        1. Medicare annual wellness visit, subsequent  updated  2. Encounter for immunization  -     zoster recombinant adjuvanted vaccine (SHINGRIX) 50 MCG/0.5ML SUSR injection; Inject 0.5 mLs into the muscle once for 1 dose Repeat in 6 months, Disp-1 each, R-1Print  3. Memory loss  Check labs and set up CT and visit to [facility]  MMSE 29/30 today  -     CBC with Auto Differential; Future  -     Basic Metabolic Panel; Future  -     Hepatic Function Panel; Future  -     TSH; Future  -     RPR; Future  -     TAVARES by IFA w/Reflex; Future  -     Vitamin B12; Future  -     Referral Jaden Aj MD, Neurology, [place]  -     CT HEAD WO CONTRAST; Future  4. Essential hypertension  Continue losartan  BP at goal.  5. Coronary artery disease involving native coronary artery of native heart without angina pectoris  Sees Cardiology  No current chest pain  6. Mixed hyperlipidemia  Intolerant of statins  -     Lipid Panel; Future  7. Type 2 diabetes mellitus without complication, without long-term current use of

## 2025-03-06 ENCOUNTER — HOSPITAL ENCOUNTER (OUTPATIENT)
Facility: HOSPITAL | Age: 75
Discharge: HOME | End: 2025-03-09
Attending: FAMILY MEDICINE
Payer: MEDICARE

## 2025-03-06 PROCEDURE — 70450 CT HEAD/BRAIN W/O DYE: CPT

## 2025-03-24 ENCOUNTER — OFFICE VISIT (OUTPATIENT)
Facility: CLINIC | Age: 75
End: 2025-03-24
Payer: MEDICARE

## 2025-03-24 VITALS
WEIGHT: 178.8 LBS | RESPIRATION RATE: 17 BRPM | HEART RATE: 57 BPM | DIASTOLIC BLOOD PRESSURE: 69 MMHG | HEIGHT: 67 IN | TEMPERATURE: 97.3 F | SYSTOLIC BLOOD PRESSURE: 135 MMHG | OXYGEN SATURATION: 98 % | BODY MASS INDEX: 28.06 KG/M2

## 2025-03-24 DIAGNOSIS — R41.3 MEMORY LOSS: Primary | ICD-10-CM

## 2025-03-24 DIAGNOSIS — E78.2 MIXED HYPERLIPIDEMIA: ICD-10-CM

## 2025-03-24 DIAGNOSIS — E11.9 TYPE 2 DIABETES MELLITUS WITHOUT COMPLICATION, WITHOUT LONG-TERM CURRENT USE OF INSULIN: ICD-10-CM

## 2025-03-24 PROCEDURE — G8419 CALC BMI OUT NRM PARAM NOF/U: HCPCS | Performed by: FAMILY MEDICINE

## 2025-03-24 PROCEDURE — 99214 OFFICE O/P EST MOD 30 MIN: CPT | Performed by: FAMILY MEDICINE

## 2025-03-24 PROCEDURE — 1123F ACP DISCUSS/DSCN MKR DOCD: CPT | Performed by: FAMILY MEDICINE

## 2025-03-24 PROCEDURE — 3051F HG A1C>EQUAL 7.0%<8.0%: CPT | Performed by: FAMILY MEDICINE

## 2025-03-24 PROCEDURE — 1036F TOBACCO NON-USER: CPT | Performed by: FAMILY MEDICINE

## 2025-03-24 PROCEDURE — 3075F SYST BP GE 130 - 139MM HG: CPT | Performed by: FAMILY MEDICINE

## 2025-03-24 PROCEDURE — G8427 DOCREV CUR MEDS BY ELIG CLIN: HCPCS | Performed by: FAMILY MEDICINE

## 2025-03-24 PROCEDURE — 1126F AMNT PAIN NOTED NONE PRSNT: CPT | Performed by: FAMILY MEDICINE

## 2025-03-24 PROCEDURE — 3078F DIAST BP <80 MM HG: CPT | Performed by: FAMILY MEDICINE

## 2025-03-24 PROCEDURE — 1159F MED LIST DOCD IN RCRD: CPT | Performed by: FAMILY MEDICINE

## 2025-03-24 ASSESSMENT — ENCOUNTER SYMPTOMS
SHORTNESS OF BREATH: 0
BLOOD IN STOOL: 0

## 2025-03-24 NOTE — PROGRESS NOTES
Bob Martino  74 y.o. male  1950  MRN:[id number]  [facility]  Progress Note     Encounter Date: 3/24/2025    Assessment and Plan:       ICD-10-CM    1. Memory loss  R41.3       2. Type 2 diabetes mellitus without complication, without long-term current use of insulin (HCC)  E11.9       3. Mixed hyperlipidemia  E78.2         1. Memory loss  No new concerning sx.  Reviewed his labs and CT with him  All normal except for elevated A1c  Continue efforts to maximize self care with diet, sleep, exercise.  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  See [facility] as scheduled  See me in 3 months  2. Type 2 diabetes mellitus without complication, without long-term current use of insulin (HCC)  Random glucose 173  A1c 7.4  Diet as discussed.   Repeat labs in 3 months  Start medication if not back under 7  3. Mixed hyperlipidemia  Intolerant of statins  Olive oil, healthy oils discussed.  Cardiac stents in past.       I have discussed the diagnosis with the patient and the intended plan as seen in the above orders.  he has expressed understanding.  The patient has received an after-visit summary and questions were answered concerning future plans.  I have discussed medication side effects and warnings with the patient as well.    Electronically Signed: Lisandro Dai MD    Current Medications after this visit     Current Outpatient Medications   Medication Sig    vitamin B-12 (CYANOCOBALAMIN) 100 MCG tablet Take 0.5 tablets by mouth daily    losartan (COZAAR) 50 MG tablet TAKE 1 TABLET DAILY    blood glucose test strips (ASCENSIA AUTODISC VI;ONE TOUCH ULTRA TEST VI) strip Test fasting blood sugar twice daily. Dx E11.9    Lancets MISC 1 each by Does not apply route daily    aspirin 81 MG EC tablet Take 1 tablet by mouth daily    vitamin D (CHOLECALCIFEROL) 25 MCG (1000 UT) TABS tablet Take 2 tablets by mouth

## 2025-03-24 NOTE — PATIENT INSTRUCTIONS
[employer] stress B complex    Diabetes:  Blood sugar goals:  Hemoglobin A1c under 7  Fasting blood sugar   Blood sugar 2 hours after a meal under 180, 4 hours after a meal under 120  No hypoglycemia (sugars under 70 and symptomatic low sugars)    Blood sugar control with diet and exercise:  Exercise 45 minutes per day.  This makes your insulin work better.  It also allows insulin levels to fall helping with weight loss.  Every night, try to fast from your evening meal to breakfast (at least 12 hours) without eating anything.  This uses stored energy in your liver and makes insulin work better.  Avoid simples sugars such as table sugar in drinks (sodas, lemonade, sweet tea, wine), desserts, candy.  Also avoid fruit juices and high fructose corn syrup.  Avoid frequent consumption of fruit especially grapes and bananas which are high in sugar. Finally, drink less milk which has milk sugar in it.  Control your starch intake.  Men should have 3-4 carb portions per meal.  Women should have 2-3 carb portions per meal.  A carb serving is the equivalent of a slice of bread.    Control your blood pressure and cholesterol.  These problems are common in diabetes.  AND, don't smoke.  All of these problems contribute to heart disease and stroke risk.    Get a yearly eye exam and flu shot.    Make sure your vaccines are up to date particularly the pneumonia vaccine.    Inspect your feet daily.  Wear comfortable protective shoes and clean socks.  Seek medical care if there are sore, calluses or numbness and burning of your feet.     See your doctor at least every 6 months if you are on oral medicines or more often if the diabetic control is not at goal or if you are on insulin.  Take your medicines religiously.           The goal blood pressure for most patients is 140/90.    The whole point of treating blood pressure is to prevent strokes, heart attacks and kidney damage.  Persistently elevated blood pressure damages blood

## 2025-05-27 DIAGNOSIS — I10 ESSENTIAL (PRIMARY) HYPERTENSION: ICD-10-CM

## 2025-05-27 RX ORDER — LOSARTAN POTASSIUM 50 MG/1
50 TABLET ORAL DAILY
Qty: 90 TABLET | Refills: 1 | Status: SHIPPED | OUTPATIENT
Start: 2025-05-27

## 2025-05-27 NOTE — TELEPHONE ENCOUNTER
Chief Complaint   Patient presents with    Medication Refill       Requested Prescriptions     Pending Prescriptions Disp Refills    losartan (COZAAR) 50 MG tablet [Pharmacy Med Name: LOSARTAN TABS 50MG] 90 tablet 3     Sig: TAKE 1 TABLET DAILY       Allergies:  Allergies   Allergen Reactions    Atorvastatin Myalgia     forgetfullness  Other reaction(s): Myalgia  forgetfullness    Nsaids Other (See Comments)     \"Brain fog and forgetfullness\"    Simvastatin Other (See Comments)     forgetfullness    Other reaction(s): Other (comments)  forgetfullness    Sulfa Antibiotics      Other reaction(s): Unknown (comments)  Patient is unaware       Last visit with clinic:  3/24/2025   Next visit with clinic: 6/24/2025     Last visit with this provider: 3/24/2025   Next Visit with this provider: 6/24/2025    Signed by Lucinda Manuel MA CMA  05/27/25  12:35 PM

## 2025-05-30 ENCOUNTER — TELEPHONE (OUTPATIENT)
Age: 75
End: 2025-05-30

## 2025-06-18 ENCOUNTER — OFFICE VISIT (OUTPATIENT)
Age: 75
End: 2025-06-18
Payer: MEDICARE

## 2025-06-18 VITALS
SYSTOLIC BLOOD PRESSURE: 137 MMHG | OXYGEN SATURATION: 96 % | TEMPERATURE: 97.6 F | HEIGHT: 67 IN | HEART RATE: 46 BPM | DIASTOLIC BLOOD PRESSURE: 70 MMHG | BODY MASS INDEX: 28 KG/M2 | RESPIRATION RATE: 15 BRPM

## 2025-06-18 DIAGNOSIS — R41.3 MEMORY DEFICITS: Primary | ICD-10-CM

## 2025-06-18 PROBLEM — I63.9 ISCHEMIC STROKE (HCC): Status: RESOLVED | Noted: 2024-07-01 | Resolved: 2025-06-18

## 2025-06-18 PROBLEM — E11.65 HYPERGLYCEMIA DUE TO TYPE 2 DIABETES MELLITUS (HCC): Status: RESOLVED | Noted: 2025-06-18 | Resolved: 2025-06-18

## 2025-06-18 PROBLEM — E04.1 THYROID NODULE: Status: RESOLVED | Noted: 2025-06-18 | Resolved: 2025-06-18

## 2025-06-18 PROBLEM — E55.9 VITAMIN D DEFICIENCY: Status: RESOLVED | Noted: 2025-06-18 | Resolved: 2025-06-18

## 2025-06-18 PROBLEM — R26.9 ABNORMAL GAIT: Status: RESOLVED | Noted: 2024-07-01 | Resolved: 2025-06-18

## 2025-06-18 PROBLEM — H52.229 REGULAR ASTIGMATISM: Status: RESOLVED | Noted: 2025-06-18 | Resolved: 2025-06-18

## 2025-06-18 PROBLEM — R00.1 BRADYCARDIA: Status: RESOLVED | Noted: 2024-07-01 | Resolved: 2025-06-18

## 2025-06-18 PROBLEM — H52.4 PRESBYOPIA: Status: RESOLVED | Noted: 2025-06-18 | Resolved: 2025-06-18

## 2025-06-18 PROCEDURE — 99204 OFFICE O/P NEW MOD 45 MIN: CPT | Performed by: PSYCHIATRY & NEUROLOGY

## 2025-06-18 PROCEDURE — 96132 NRPSYC TST EVAL PHYS/QHP 1ST: CPT | Performed by: PSYCHIATRY & NEUROLOGY

## 2025-06-18 PROCEDURE — 1123F ACP DISCUSS/DSCN MKR DOCD: CPT | Performed by: PSYCHIATRY & NEUROLOGY

## 2025-06-18 PROCEDURE — 1036F TOBACCO NON-USER: CPT | Performed by: PSYCHIATRY & NEUROLOGY

## 2025-06-18 PROCEDURE — G8427 DOCREV CUR MEDS BY ELIG CLIN: HCPCS | Performed by: PSYCHIATRY & NEUROLOGY

## 2025-06-18 PROCEDURE — 3075F SYST BP GE 130 - 139MM HG: CPT | Performed by: PSYCHIATRY & NEUROLOGY

## 2025-06-18 PROCEDURE — 96138 PSYCL/NRPSYC TECH 1ST: CPT | Performed by: PSYCHIATRY & NEUROLOGY

## 2025-06-18 PROCEDURE — 1126F AMNT PAIN NOTED NONE PRSNT: CPT | Performed by: PSYCHIATRY & NEUROLOGY

## 2025-06-18 PROCEDURE — 3078F DIAST BP <80 MM HG: CPT | Performed by: PSYCHIATRY & NEUROLOGY

## 2025-06-18 PROCEDURE — 1159F MED LIST DOCD IN RCRD: CPT | Performed by: PSYCHIATRY & NEUROLOGY

## 2025-06-18 PROCEDURE — G8419 CALC BMI OUT NRM PARAM NOF/U: HCPCS | Performed by: PSYCHIATRY & NEUROLOGY

## 2025-06-18 NOTE — PROCEDURES
Neurocognitive Test Administration    Patient Information:  ELENA CALHOUN  1950  Male  This 74 year old male was administered a battery of neurocognitive testing on 06/18/2025.    Reason for Testing:  Memory deficits    Test Administration Time:  The time spent administering cognitive testing was 16 minutes including setting the patient up, creating the order, discussing cognitive testing, answering questions, administering the test / active testing time, ensuring best practices (i.e. no distractions), and uploading the results. This cognitive testing was administered by a technician.    Neurocognitive Test Interpretation    Reason for Testing:  Memory deficits    Tests Administered:  Trails A, Trails B, Stroop, Digit Symbol Substitution, Immediate Recognition, Delayed Recognition    Test Results:  Cognitive testing was provided via a battery of cognitive assessments. The pattern of test scores indicate that results are valid.    A Clinical Report with further description of scores and results is also available.    Overall: Patient tested in the 85th percentile (scaled standard score of 116).    Trails A: Patient tested in the 56th percentile (scaled standard score of 102).  Trails B: Patient tested in the 80th percentile (scaled standard score of 113).  Stroop: Patient tested in the 69th percentile (scaled standard score of 107).  Digit Symbol Substitution: Patient tested in the 72nd percentile (scaled standard score of 109).  Immediate Recognition: Patient tested in the 85th percentile (scaled standard score of 116).  Delayed Recognition: Patient tested in the 91st percentile (scaled standard score of 120).    Interpretation of Test Scores:  Examination of individual component tests shows:  Attention - Trails A: Unlikely Impairment  Mental Flexibility - Trails B: Unlikely Impairment  Executive Function - Stroop: Unlikely Impairment  Processing Speed - Digit Symbol Substitution: Unlikely Impairment  Memory -

## 2025-06-18 NOTE — PROGRESS NOTES
[facility] at [facility] at [address] [place] [address] [facility] [address] [place] [address] [phone] Office  [phone] Facsimile           Referring:     Chief Complaint   Patient presents with    New Patient     Memory evaluation     History of Present Illness  74-year-old gentleman presenting today accompanied by his wife for neurologic consultation regarding memory difficulty.  Both provide history.    He reports experiencing word-finding difficulties, such as an incident this morning where he was unable to recall the brand of his phone during a conversation with his daughter. Additionally, he describes a sense of disorganization in his daily activities, often starting one task before completing another. His wife corroborates these observations, noting that he frequently leaves cabinet doors open after use. He does not report any repetitive questioning. His wife suggests the possibility of undiagnosed ADHD, given his constant busyness and inability to sit still for extended periods. She also mentions occasional memory lapses, such as forgetting tasks or performing unrelated actions when asked to do something. He has a family history of dementia, particularly his father who was diagnosed at the age of 88.    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

## 2025-06-18 NOTE — PROGRESS NOTES
[id number] (16 minute minimum)  _16_ minutes were spent administering cognitive testing by medical assistant/nurse.

## 2025-06-18 NOTE — PATIENT INSTRUCTIONS
Neurocognitive consult/testing procedure:    Please contact office after scheduling with one of the facilities listed below with whom you are seeing, date and time of appt and we will fax orders and notes tho that facility and schedule your follow up with our office after testing.      [facility]  Dr. Thiago Lay  [address]  [facility] [address]  (P) [phone]    [facility]  Dr. Jess Jefferson  [address]  [facility] [address]  (P) [phone]     [facility]  Dr. Pancho Jaquez  [address]  [facility] [address]  (P) [phone]    [facility] ([email or web address]  Dr. Cande Charles  [address] [place] [address]  Office: [phone]   Fax: [phone]    [facility]   Dr. Manuela Urrutia  [address] [place], [place] [address]  (P) [phone]  (F) [phone]

## 2025-06-18 NOTE — PROGRESS NOTES
Bob Martino is a 74 y.o. male  Chief Complaint   Patient presents with    New Patient     Memory evaluation     Vitals:    06/18/25 1546   BP: 137/70   BP Site: Left Upper Arm   Patient Position: Sitting   Pulse: (!) 46   Resp: 15   Temp: 97.6 °F (36.4 °C)   SpO2: 96%   Height: 1.702 m (5' 7\")           Health Maintenance Due   Topic Date Due    Pneumococcal 50+ years Vaccine (1 of 2 - PCV) 11/12/1967    Shingles vaccine (1 of 2) Never done    Respiratory Syncytial Virus (RSV) Pregnant or age 60 yrs+ (1 - 1-dose 75+ series) Never done    DTaP/Tdap/Td vaccine (2 - Td or Tdap) 01/01/2024    COVID-19 Vaccine (4 - 2024-25 season) 09/01/2024    Diabetic Alb to Cr ratio (uACR) test  12/12/2024         \"Have you been to the ER, urgent care clinic since your last visit?  Hospitalized since your last visit?\"    NO    “Have you seen or consulted any other health care providers outside of [facility] since your last visit?”    NO

## 2025-06-24 ENCOUNTER — OFFICE VISIT (OUTPATIENT)
Facility: CLINIC | Age: 75
End: 2025-06-24
Payer: MEDICARE

## 2025-06-24 VITALS
RESPIRATION RATE: 17 BRPM | BODY MASS INDEX: 27.47 KG/M2 | SYSTOLIC BLOOD PRESSURE: 110 MMHG | HEART RATE: 72 BPM | HEIGHT: 67 IN | DIASTOLIC BLOOD PRESSURE: 62 MMHG | OXYGEN SATURATION: 98 % | WEIGHT: 175 LBS | TEMPERATURE: 97.7 F

## 2025-06-24 DIAGNOSIS — R41.3 MEMORY LOSS: Primary | ICD-10-CM

## 2025-06-24 DIAGNOSIS — E11.9 TYPE 2 DIABETES MELLITUS WITHOUT COMPLICATION, WITHOUT LONG-TERM CURRENT USE OF INSULIN (HCC): ICD-10-CM

## 2025-06-24 LAB — HBA1C MFR BLD: 7.3 %

## 2025-06-24 PROCEDURE — 1123F ACP DISCUSS/DSCN MKR DOCD: CPT | Performed by: FAMILY MEDICINE

## 2025-06-24 PROCEDURE — 1126F AMNT PAIN NOTED NONE PRSNT: CPT | Performed by: FAMILY MEDICINE

## 2025-06-24 PROCEDURE — 83036 HEMOGLOBIN GLYCOSYLATED A1C: CPT | Performed by: FAMILY MEDICINE

## 2025-06-24 PROCEDURE — 1036F TOBACCO NON-USER: CPT | Performed by: FAMILY MEDICINE

## 2025-06-24 PROCEDURE — 1159F MED LIST DOCD IN RCRD: CPT | Performed by: FAMILY MEDICINE

## 2025-06-24 PROCEDURE — 3051F HG A1C>EQUAL 7.0%<8.0%: CPT | Performed by: FAMILY MEDICINE

## 2025-06-24 PROCEDURE — G8427 DOCREV CUR MEDS BY ELIG CLIN: HCPCS | Performed by: FAMILY MEDICINE

## 2025-06-24 PROCEDURE — 3074F SYST BP LT 130 MM HG: CPT | Performed by: FAMILY MEDICINE

## 2025-06-24 PROCEDURE — G8419 CALC BMI OUT NRM PARAM NOF/U: HCPCS | Performed by: FAMILY MEDICINE

## 2025-06-24 PROCEDURE — 3078F DIAST BP <80 MM HG: CPT | Performed by: FAMILY MEDICINE

## 2025-06-24 PROCEDURE — 99214 OFFICE O/P EST MOD 30 MIN: CPT | Performed by: FAMILY MEDICINE

## 2025-06-24 SDOH — ECONOMIC STABILITY: FOOD INSECURITY: WITHIN THE PAST 12 MONTHS, YOU WORRIED THAT YOUR FOOD WOULD RUN OUT BEFORE YOU GOT MONEY TO BUY MORE.: NEVER TRUE

## 2025-06-24 SDOH — ECONOMIC STABILITY: FOOD INSECURITY: WITHIN THE PAST 12 MONTHS, THE FOOD YOU BOUGHT JUST DIDN'T LAST AND YOU DIDN'T HAVE MONEY TO GET MORE.: NEVER TRUE

## 2025-06-24 ASSESSMENT — PATIENT HEALTH QUESTIONNAIRE - PHQ9
2. FEELING DOWN, DEPRESSED OR HOPELESS: NOT AT ALL
1. LITTLE INTEREST OR PLEASURE IN DOING THINGS: NOT AT ALL
SUM OF ALL RESPONSES TO PHQ QUESTIONS 1-9: 0

## 2025-06-24 ASSESSMENT — ENCOUNTER SYMPTOMS: SHORTNESS OF BREATH: 0

## 2025-06-24 NOTE — PROGRESS NOTES
Bob Martino  74 y.o. male  1950  MRN:[id number]  [facility]  Progress Note     Encounter Date: 6/24/2025    Assessment and Plan:       ICD-10-CM    1. Memory loss  R41.3       2. Type 2 diabetes mellitus without complication, without long-term current use of insulin (HCC)  E11.9 AMB POC HEMOGLOBIN A1C        1. Memory loss  Per [facility], Dr. Medellin, no clear cognitive issue although patient may have an element of ADD, concentration issues.  He has been given a list of names of NEUROPYSCHOLOGISTS from Dr. Medellin and can pursue further testing with them.  With the patient's history of cardiac disease and at his age, I would not recommend stimulant medication.  I have shown him the videos by Dr. Jose F Denis about non pharmacologic treatment of ADD for improved cognitive performance and concentration.  He is very receptive to trying this as an option and will watch videos with his spouse.  2. Type 2 diabetes mellitus without complication, without long-term current use of insulin ([facility])  Diet only.  Discussed.  -     AMB POC HEMOGLOBIN A1C 7.3       I have discussed the diagnosis with the patient and the intended plan as seen in the above orders.  he has expressed understanding.  The patient has received an after-visit summary and questions were answered concerning future plans.  I have discussed medication side effects and warnings with the patient as well.    Electronically Signed: Lisandro Dai MD    Current Medications after this visit     Current Outpatient Medications   Medication Sig    losartan (COZAAR) 50 MG tablet TAKE 1 TABLET DAILY    vitamin B-12 (CYANOCOBALAMIN) 100 MCG tablet Take 0.5 tablets by mouth daily    blood glucose test strips (ASCENSIA AUTODISC VI;ONE TOUCH ULTRA TEST VI) strip Test fasting blood sugar twice daily. Dx E11.9    Lancets MISC 1 each by Does not apply route daily    aspirin 81 MG EC tablet Take 1 tablet by mouth daily    vitamin D

## (undated) DEVICE — SUT ETHLN 4-0 18IN PS2 BLK --

## (undated) DEVICE — PAD,NON-ADHERENT,3X8,STERILE,LF,1/PK: Brand: MEDLINE

## (undated) DEVICE — 1200 GUARD II KIT W/5MM TUBE W/O VAC TUBE: Brand: GUARDIAN

## (undated) DEVICE — STERILE POLYISOPRENE POWDER-FREE SURGICAL GLOVES: Brand: PROTEXIS

## (undated) DEVICE — CUFF RMFG BP INF SZ 11 DISP -- LAWSON OEM ITEM 238915

## (undated) DEVICE — SUTURE NRLN SZ 4-0 L18IN NONABSORBABLE BLK L13MM TF 1/2 CIR C584D

## (undated) DEVICE — KIT COLON DUAL END BRSH W/LUBE -- CUSTOM BX/20 FORMERLY KS-18-20

## (undated) DEVICE — HOOK LOCK LATEX FREE ELASTIC BANDAGE 3INX5YD

## (undated) DEVICE — SOL IRRIGATION INJ NACL 0.9% 500ML BTL

## (undated) DEVICE — DRAPE C ARM W54XL84IN MINI FOR OEC 6800

## (undated) DEVICE — BANDAGE COMPR 9 FTX4 IN SMOOTH COMFORTABLE SYNTH ESMRK LF

## (undated) DEVICE — DRAPE,REIN 53X77,STERILE: Brand: MEDLINE

## (undated) DEVICE — ELECTRODE,RADIOTRANSLUCENT,FOAM,3PK: Brand: MEDLINE

## (undated) DEVICE — COVER LT HNDL BLU PLAS

## (undated) DEVICE — CATH IV AUTOGRD BC BLU 22GA 25 -- INSYTE

## (undated) DEVICE — CANN NASAL O2 CAPNOGRAPHY AD -- FILTERLINE

## (undated) DEVICE — REM POLYHESIVE ADULT PATIENT RETURN ELECTRODE: Brand: VALLEYLAB

## (undated) DEVICE — (D)PREP SKN CHLRAPRP APPL 26ML -- CONVERT TO ITEM 371833

## (undated) DEVICE — ARGYLE FRAZIER SURGICAL SUCTION INSTRUMENT 10 FR/CH (3.3 MM): Brand: ARGYLE

## (undated) DEVICE — SET GRAV CK VLV NEEDLESS ST 3 GANGED 4WAY STPCOCK HI FLO 10

## (undated) DEVICE — STRETCH BANDAGE ROLL: Brand: DERMACEA

## (undated) DEVICE — DISPOSABLE TOURNIQUET CUFF SINGLE BLADDER, DUAL PORT AND QUICK CONNECT CONNECTOR: Brand: COLOR CUFF

## (undated) DEVICE — SENSOR OXMTR SPO2 ADLT NELLCOR DISP

## (undated) DEVICE — SIMPLICITY FLUFF UNDERPAD 23X36, MODERATE: Brand: SIMPLICITY

## (undated) DEVICE — BASIC PACK: Brand: CONVERTORS

## (undated) DEVICE — 3M™ CUROS™ DISINFECTING CAP FOR NEEDLELESS CONNECTORS 270/CARTON 20 CARTONS/CASE CFF1-270: Brand: CUROS™

## (undated) DEVICE — SOLIDIFIER MEDC 1200ML -- CONVERT TO 356117

## (undated) DEVICE — DRAPE,EXTREMITY,89X128,STERILE: Brand: MEDLINE

## (undated) DEVICE — SUPER SPONGES,MEDIUM: Brand: DERMACEA

## (undated) DEVICE — Device

## (undated) DEVICE — BAG BELONG PT PERS CLEAR HANDL

## (undated) DEVICE — ASTOUND STANDARD SURGICAL GOWN, XL: Brand: CONVERTORS

## (undated) DEVICE — TOWEL SURG W17XL27IN STD BLU COT NONFENESTRATED PREWASHED

## (undated) DEVICE — KIT INFECTION CTRL ST FRAN --

## (undated) DEVICE — DRSG BURN GZ FN MSH 4X25IN --

## (undated) DEVICE — SURGICAL PROCEDURE PACK BASIN MAJ SET CUST NO CAUT